# Patient Record
Sex: FEMALE | Race: WHITE | NOT HISPANIC OR LATINO | Employment: OTHER | URBAN - METROPOLITAN AREA
[De-identification: names, ages, dates, MRNs, and addresses within clinical notes are randomized per-mention and may not be internally consistent; named-entity substitution may affect disease eponyms.]

---

## 2021-12-16 ENCOUNTER — HOSPITAL ENCOUNTER (OUTPATIENT)
Facility: HOSPITAL | Age: 81
LOS: 1 days | Discharge: HOME | End: 2021-12-19
Attending: EMERGENCY MEDICINE | Admitting: EMERGENCY MEDICINE

## 2021-12-16 ENCOUNTER — APPOINTMENT (OUTPATIENT)
Dept: GENERAL RADIOLOGY | Facility: HOSPITAL | Age: 81
End: 2021-12-16

## 2021-12-16 ENCOUNTER — APPOINTMENT (OUTPATIENT)
Dept: CT IMAGING | Facility: HOSPITAL | Age: 81
End: 2021-12-16

## 2021-12-16 DIAGNOSIS — D64.9 ANEMIA, UNSPECIFIED TYPE: ICD-10-CM

## 2021-12-16 DIAGNOSIS — Z79.01 CHRONIC ANTICOAGULATION: ICD-10-CM

## 2021-12-16 DIAGNOSIS — I10 HYPERTENSION, UNSPECIFIED TYPE: ICD-10-CM

## 2021-12-16 DIAGNOSIS — I48.91 ATRIAL FIBRILLATION, UNSPECIFIED TYPE (HCC): ICD-10-CM

## 2021-12-16 DIAGNOSIS — R42 DIZZINESS: ICD-10-CM

## 2021-12-16 DIAGNOSIS — K92.2 GASTROINTESTINAL HEMORRHAGE, UNSPECIFIED GASTROINTESTINAL HEMORRHAGE TYPE: Primary | ICD-10-CM

## 2021-12-16 PROBLEM — E78.5 HLD (HYPERLIPIDEMIA): Status: ACTIVE | Noted: 2021-12-16

## 2021-12-16 LAB
ABO GROUP BLD: NORMAL
ABO GROUP BLD: NORMAL
ALBUMIN SERPL-MCNC: 3.9 G/DL (ref 3.5–5.2)
ALBUMIN/GLOB SERPL: 1.7 G/DL
ALP SERPL-CCNC: 64 U/L (ref 39–117)
ALT SERPL W P-5'-P-CCNC: 15 U/L (ref 1–33)
ANION GAP SERPL CALCULATED.3IONS-SCNC: 10 MMOL/L (ref 5–15)
AST SERPL-CCNC: 18 U/L (ref 1–32)
B PARAPERT DNA SPEC QL NAA+PROBE: NOT DETECTED
B PERT DNA SPEC QL NAA+PROBE: NOT DETECTED
BASOPHILS # BLD AUTO: 0.03 10*3/MM3 (ref 0–0.2)
BASOPHILS NFR BLD AUTO: 0.3 % (ref 0–1.5)
BILIRUB SERPL-MCNC: 0.3 MG/DL (ref 0–1.2)
BILIRUB UR QL STRIP: NEGATIVE
BLD GP AB SCN SERPL QL: NEGATIVE
BUN SERPL-MCNC: 39 MG/DL (ref 8–23)
BUN/CREAT SERPL: 32 (ref 7–25)
C PNEUM DNA NPH QL NAA+NON-PROBE: NOT DETECTED
CALCIUM SPEC-SCNC: 8.8 MG/DL (ref 8.6–10.5)
CHLORIDE SERPL-SCNC: 97 MMOL/L (ref 98–107)
CLARITY UR: CLEAR
CO2 SERPL-SCNC: 24 MMOL/L (ref 22–29)
COLOR UR: YELLOW
CREAT SERPL-MCNC: 1.22 MG/DL (ref 0.57–1)
DEPRECATED RDW RBC AUTO: 44.7 FL (ref 37–54)
DEVELOPER EXPIRATION DATE: ABNORMAL
DEVELOPER LOT NUMBER: ABNORMAL
EOSINOPHIL # BLD AUTO: 0 10*3/MM3 (ref 0–0.4)
EOSINOPHIL NFR BLD AUTO: 0 % (ref 0.3–6.2)
ERYTHROCYTE [DISTWIDTH] IN BLOOD BY AUTOMATED COUNT: 12.9 % (ref 12.3–15.4)
EXPIRATION DATE: ABNORMAL
FECAL OCCULT BLOOD SCREEN, POC: POSITIVE
FLUAV SUBTYP SPEC NAA+PROBE: NOT DETECTED
FLUBV RNA ISLT QL NAA+PROBE: NOT DETECTED
GFR SERPL CREATININE-BSD FRML MDRD: 42 ML/MIN/1.73
GLOBULIN UR ELPH-MCNC: 2.3 GM/DL
GLUCOSE SERPL-MCNC: 154 MG/DL (ref 65–99)
GLUCOSE UR STRIP-MCNC: NEGATIVE MG/DL
HADV DNA SPEC NAA+PROBE: NOT DETECTED
HCOV 229E RNA SPEC QL NAA+PROBE: NOT DETECTED
HCOV HKU1 RNA SPEC QL NAA+PROBE: NOT DETECTED
HCOV NL63 RNA SPEC QL NAA+PROBE: NOT DETECTED
HCOV OC43 RNA SPEC QL NAA+PROBE: NOT DETECTED
HCT VFR BLD AUTO: 22.9 % (ref 34–46.6)
HGB BLD-MCNC: 6.7 G/DL (ref 12–15.9)
HGB BLD-MCNC: 7.8 G/DL (ref 12–15.9)
HGB UR QL STRIP.AUTO: NEGATIVE
HMPV RNA NPH QL NAA+NON-PROBE: NOT DETECTED
HOLD SPECIMEN: NORMAL
HPIV1 RNA ISLT QL NAA+PROBE: NOT DETECTED
HPIV2 RNA SPEC QL NAA+PROBE: NOT DETECTED
HPIV3 RNA NPH QL NAA+PROBE: NOT DETECTED
HPIV4 P GENE NPH QL NAA+PROBE: NOT DETECTED
IMM GRANULOCYTES # BLD AUTO: 0.06 10*3/MM3 (ref 0–0.05)
IMM GRANULOCYTES NFR BLD AUTO: 0.5 % (ref 0–0.5)
KETONES UR QL STRIP: NEGATIVE
LEUKOCYTE ESTERASE UR QL STRIP.AUTO: NEGATIVE
LYMPHOCYTES # BLD AUTO: 1.11 10*3/MM3 (ref 0.7–3.1)
LYMPHOCYTES NFR BLD AUTO: 9.5 % (ref 19.6–45.3)
Lab: ABNORMAL
M PNEUMO IGG SER IA-ACNC: NOT DETECTED
MCH RBC QN AUTO: 31.8 PG (ref 26.6–33)
MCHC RBC AUTO-ENTMCNC: 34.1 G/DL (ref 31.5–35.7)
MCV RBC AUTO: 93.5 FL (ref 79–97)
MONOCYTES # BLD AUTO: 0.69 10*3/MM3 (ref 0.1–0.9)
MONOCYTES NFR BLD AUTO: 5.9 % (ref 5–12)
NEGATIVE CONTROL: NEGATIVE
NEUTROPHILS NFR BLD AUTO: 83.8 % (ref 42.7–76)
NEUTROPHILS NFR BLD AUTO: 9.83 10*3/MM3 (ref 1.7–7)
NITRITE UR QL STRIP: NEGATIVE
NRBC BLD AUTO-RTO: 0 /100 WBC (ref 0–0.2)
NT-PROBNP SERPL-MCNC: 2576 PG/ML (ref 0–1800)
PH UR STRIP.AUTO: 5.5 [PH] (ref 5–8)
PLATELET # BLD AUTO: 279 10*3/MM3 (ref 140–450)
PMV BLD AUTO: 9.9 FL (ref 6–12)
POSITIVE CONTROL: POSITIVE
POTASSIUM SERPL-SCNC: 4.4 MMOL/L (ref 3.5–5.2)
PROT SERPL-MCNC: 6.2 G/DL (ref 6–8.5)
PROT UR QL STRIP: NEGATIVE
QT INTERVAL: 436 MS
QTC INTERVAL: 480 MS
RBC # BLD AUTO: 2.45 10*6/MM3 (ref 3.77–5.28)
RH BLD: POSITIVE
RH BLD: POSITIVE
RHINOVIRUS RNA SPEC NAA+PROBE: NOT DETECTED
RSV RNA NPH QL NAA+NON-PROBE: NOT DETECTED
SARS-COV-2 RNA NPH QL NAA+NON-PROBE: NOT DETECTED
SODIUM SERPL-SCNC: 131 MMOL/L (ref 136–145)
SP GR UR STRIP: 1.02 (ref 1–1.03)
T&S EXPIRATION DATE: NORMAL
TROPONIN T SERPL-MCNC: <0.01 NG/ML (ref 0–0.03)
UROBILINOGEN UR QL STRIP: NORMAL
WBC NRBC COR # BLD: 11.72 10*3/MM3 (ref 3.4–10.8)
WHOLE BLOOD HOLD SPECIMEN: NORMAL
WHOLE BLOOD HOLD SPECIMEN: NORMAL

## 2021-12-16 PROCEDURE — A9270 NON-COVERED ITEM OR SERVICE: HCPCS | Performed by: NURSE PRACTITIONER

## 2021-12-16 PROCEDURE — 0202U NFCT DS 22 TRGT SARS-COV-2: CPT | Performed by: EMERGENCY MEDICINE

## 2021-12-16 PROCEDURE — 36430 TRANSFUSION BLD/BLD COMPNT: CPT

## 2021-12-16 PROCEDURE — 99284 EMERGENCY DEPT VISIT MOD MDM: CPT

## 2021-12-16 PROCEDURE — G0378 HOSPITAL OBSERVATION PER HR: HCPCS

## 2021-12-16 PROCEDURE — 99220 PR INITIAL OBSERVATION CARE/DAY 70 MINUTES: CPT | Performed by: INTERNAL MEDICINE

## 2021-12-16 PROCEDURE — 81003 URINALYSIS AUTO W/O SCOPE: CPT | Performed by: PHYSICIAN ASSISTANT

## 2021-12-16 PROCEDURE — 93005 ELECTROCARDIOGRAM TRACING: CPT | Performed by: PHYSICIAN ASSISTANT

## 2021-12-16 PROCEDURE — 86901 BLOOD TYPING SEROLOGIC RH(D): CPT | Performed by: EMERGENCY MEDICINE

## 2021-12-16 PROCEDURE — 85018 HEMOGLOBIN: CPT | Performed by: NURSE PRACTITIONER

## 2021-12-16 PROCEDURE — 25010000002 IOPAMIDOL 61 % SOLUTION: Performed by: EMERGENCY MEDICINE

## 2021-12-16 PROCEDURE — 86900 BLOOD TYPING SEROLOGIC ABO: CPT

## 2021-12-16 PROCEDURE — 80053 COMPREHEN METABOLIC PANEL: CPT | Performed by: EMERGENCY MEDICINE

## 2021-12-16 PROCEDURE — A9270 NON-COVERED ITEM OR SERVICE: HCPCS | Performed by: INTERNAL MEDICINE

## 2021-12-16 PROCEDURE — 74177 CT ABD & PELVIS W/CONTRAST: CPT

## 2021-12-16 PROCEDURE — 85025 COMPLETE CBC W/AUTO DIFF WBC: CPT

## 2021-12-16 PROCEDURE — 96374 THER/PROPH/DIAG INJ IV PUSH: CPT

## 2021-12-16 PROCEDURE — P9016 RBC LEUKOCYTES REDUCED: HCPCS

## 2021-12-16 PROCEDURE — 71045 X-RAY EXAM CHEST 1 VIEW: CPT

## 2021-12-16 PROCEDURE — 86900 BLOOD TYPING SEROLOGIC ABO: CPT | Performed by: EMERGENCY MEDICINE

## 2021-12-16 PROCEDURE — 86923 COMPATIBILITY TEST ELECTRIC: CPT

## 2021-12-16 PROCEDURE — 82270 OCCULT BLOOD FECES: CPT | Performed by: PHYSICIAN ASSISTANT

## 2021-12-16 PROCEDURE — 63710000001 FLECAINIDE 50 MG TABLET: Performed by: NURSE PRACTITIONER

## 2021-12-16 PROCEDURE — 84484 ASSAY OF TROPONIN QUANT: CPT | Performed by: PHYSICIAN ASSISTANT

## 2021-12-16 PROCEDURE — 86850 RBC ANTIBODY SCREEN: CPT | Performed by: EMERGENCY MEDICINE

## 2021-12-16 PROCEDURE — 83880 ASSAY OF NATRIURETIC PEPTIDE: CPT | Performed by: PHYSICIAN ASSISTANT

## 2021-12-16 PROCEDURE — 86901 BLOOD TYPING SEROLOGIC RH(D): CPT

## 2021-12-16 PROCEDURE — 70450 CT HEAD/BRAIN W/O DYE: CPT

## 2021-12-16 PROCEDURE — 96376 TX/PRO/DX INJ SAME DRUG ADON: CPT

## 2021-12-16 PROCEDURE — 63710000001 FLECAINIDE 50 MG TABLET: Performed by: INTERNAL MEDICINE

## 2021-12-16 RX ORDER — HYDROCHLOROTHIAZIDE 25 MG/1
25 TABLET ORAL DAILY
COMMUNITY
End: 2021-12-19 | Stop reason: HOSPADM

## 2021-12-16 RX ORDER — SODIUM CHLORIDE 0.9 % (FLUSH) 0.9 %
10 SYRINGE (ML) INJECTION AS NEEDED
Status: DISCONTINUED | OUTPATIENT
Start: 2021-12-16 | End: 2021-12-19 | Stop reason: HOSPADM

## 2021-12-16 RX ORDER — ACETAMINOPHEN 325 MG/1
650 TABLET ORAL EVERY 4 HOURS PRN
Status: DISCONTINUED | OUTPATIENT
Start: 2021-12-16 | End: 2021-12-19 | Stop reason: HOSPADM

## 2021-12-16 RX ORDER — PANTOPRAZOLE SODIUM 40 MG/10ML
40 INJECTION, POWDER, LYOPHILIZED, FOR SOLUTION INTRAVENOUS EVERY 12 HOURS SCHEDULED
Status: DISCONTINUED | OUTPATIENT
Start: 2021-12-16 | End: 2021-12-19 | Stop reason: HOSPADM

## 2021-12-16 RX ORDER — PANTOPRAZOLE SODIUM 40 MG/10ML
40 INJECTION, POWDER, LYOPHILIZED, FOR SOLUTION INTRAVENOUS ONCE
Status: COMPLETED | OUTPATIENT
Start: 2021-12-16 | End: 2021-12-16

## 2021-12-16 RX ORDER — PRAZOSIN HYDROCHLORIDE 1 MG/1
1 CAPSULE ORAL NIGHTLY
COMMUNITY

## 2021-12-16 RX ORDER — ASCORBIC ACID 100 MG
TABLET,CHEWABLE ORAL
COMMUNITY

## 2021-12-16 RX ORDER — FLECAINIDE ACETATE 100 MG/1
100 TABLET ORAL 2 TIMES DAILY
COMMUNITY

## 2021-12-16 RX ORDER — MULTIPLE VITAMINS W/ MINERALS TAB 9MG-400MCG
1 TAB ORAL DAILY
COMMUNITY

## 2021-12-16 RX ORDER — MULTIPLE VITAMINS W/ MINERALS TAB 9MG-400MCG
1 TAB ORAL DAILY
Status: DISCONTINUED | OUTPATIENT
Start: 2021-12-17 | End: 2021-12-19 | Stop reason: HOSPADM

## 2021-12-16 RX ORDER — FLECAINIDE ACETATE 50 MG/1
100 TABLET ORAL 2 TIMES DAILY
Status: DISCONTINUED | OUTPATIENT
Start: 2021-12-16 | End: 2021-12-19 | Stop reason: HOSPADM

## 2021-12-16 RX ORDER — ATENOLOL 50 MG/1
50 TABLET ORAL DAILY
COMMUNITY
End: 2021-12-19 | Stop reason: HOSPADM

## 2021-12-16 RX ORDER — ACETAMINOPHEN 160 MG/5ML
650 SOLUTION ORAL EVERY 4 HOURS PRN
Status: DISCONTINUED | OUTPATIENT
Start: 2021-12-16 | End: 2021-12-19 | Stop reason: HOSPADM

## 2021-12-16 RX ORDER — DILTIAZEM HYDROCHLORIDE EXTENDED-RELEASE TABLETS 240 MG/1
240 TABLET, EXTENDED RELEASE ORAL DAILY
COMMUNITY
End: 2021-12-19 | Stop reason: HOSPADM

## 2021-12-16 RX ORDER — ONDANSETRON 4 MG/1
4 TABLET, FILM COATED ORAL EVERY 6 HOURS PRN
Status: DISCONTINUED | OUTPATIENT
Start: 2021-12-16 | End: 2021-12-19 | Stop reason: HOSPADM

## 2021-12-16 RX ORDER — ONDANSETRON 2 MG/ML
4 INJECTION INTRAMUSCULAR; INTRAVENOUS EVERY 6 HOURS PRN
Status: DISCONTINUED | OUTPATIENT
Start: 2021-12-16 | End: 2021-12-19 | Stop reason: HOSPADM

## 2021-12-16 RX ORDER — SODIUM CHLORIDE 0.9 % (FLUSH) 0.9 %
10 SYRINGE (ML) INJECTION EVERY 12 HOURS SCHEDULED
Status: DISCONTINUED | OUTPATIENT
Start: 2021-12-16 | End: 2021-12-19 | Stop reason: HOSPADM

## 2021-12-16 RX ORDER — ACETAMINOPHEN 650 MG/1
650 SUPPOSITORY RECTAL EVERY 4 HOURS PRN
Status: DISCONTINUED | OUTPATIENT
Start: 2021-12-16 | End: 2021-12-19 | Stop reason: HOSPADM

## 2021-12-16 RX ADMIN — FLECAINIDE ACETATE 100 MG: 50 TABLET ORAL at 22:24

## 2021-12-16 RX ADMIN — PANTOPRAZOLE SODIUM 40 MG: 40 INJECTION, POWDER, FOR SOLUTION INTRAVENOUS at 15:40

## 2021-12-16 RX ADMIN — IOPAMIDOL 95 ML: 612 INJECTION, SOLUTION INTRAVENOUS at 14:22

## 2021-12-16 RX ADMIN — PANTOPRAZOLE SODIUM 40 MG: 40 INJECTION, POWDER, FOR SOLUTION INTRAVENOUS at 22:24

## 2021-12-16 RX ADMIN — SODIUM CHLORIDE, PRESERVATIVE FREE 10 ML: 5 INJECTION INTRAVENOUS at 22:24

## 2021-12-16 NOTE — H&P
[facility] Medicine Services  HISTORY AND PHYSICAL    Patient Name: Vy Dao  : 1940  MRN: [id number]  Primary Care Physician: Shima Jennings MD  Date of admission: 2021    Subjective   Subjective     Chief Complaint:  Bloody stool    HPI:  Vy Dao is a 81 y.o. female with PMH  Of Afib with anticoagulation, HTN, HLD. Patient was diagnosed with Afib 3 weeks ago and was started on xarelto. She started noticing some bright red blood in her stool about 2 weeks ago. She has a hx of hemorrhoids and took a supp which helped but after a few days the blood returned. Over the last two days she has been getting weak, dizzy and feeling like she was going to pass out every time she stood up. She did have some chest pressure at times. She also noticed her blood pressure was lower than normal. She has not taken her xarelto today. She denies any soa, fever, chills, or N/v/D.     In ED: bnp 2576, WBc 11.72, HGb 7.8, creat 1.22, , trop <0.010, + occult stool      COVID Details:        Symptoms: [x] NONE [] Fever []  Cough [] Shortness of breath [] Change in taste or smell  The patient qualifies to receive the vaccine, but they have not yet received it.    Review of Systems   Constitutional: Positive for appetite change and fatigue. Negative for chills and fever.   Respiratory: Positive for shortness of breath.    Cardiovascular: Negative for chest pain.   Gastrointestinal: Positive for blood in stool. Negative for abdominal distention, abdominal pain, diarrhea, nausea and vomiting.   Genitourinary: Negative for dysuria.   Neurological: Positive for dizziness, weakness and light-headedness.        All other systems reviewed and are negative.     Personal History     Past Medical History:   Diagnosis Date   • Atrial fibrillation (HCC)    • Hypertension    • Urinary tract infection        History reviewed. No pertinent surgical history.    Family History:  family history  includes Atrial fibrillation in her sister; Cancer in her sister; Heart disease in her father. Otherwise pertinent FHx was reviewed and unremarkable.     Social History:  reports that she quit smoking about 30 years ago. She has never used smokeless tobacco. She reports that she does not drink alcohol and does not use drugs.  Social History     Social History Narrative   • Not on file       Medications:  Cholecalciferol, Coenzyme Q10, Glucosamine Complex, Olmesartan Medoxomil, Omega-3 Fatty Acids, Vitamin C, atenolol, dilTIAZem LA, flecainide, hydroCHLOROthiazide, multivitamin with minerals, prazosin, and rivaroxaban    Allergies   Allergen Reactions   • Amoxicillin Rash       Objective   Objective     Vital Signs:   Temp:  [99.8 °F (37.7 °C)] 99.8 °F (37.7 °C)  Heart Rate:  [67-75] 72  Resp:  [20] 20  BP: (116-140)/(62-75) 136/73    Physical Exam   Constitutional: Awake, alert  Eyes: PERRLA, sclerae anicteric, no conjunctival injection  HENT: NCAT, mucous membranes moist  Neck: Supple, no thyromegaly, no lymphadenopathy, trachea midline  Respiratory: Clear to auscultation bilaterally, nonlabored respirations room air 97%  Cardiovascular: irregular, no murmurs, rubs, or gallops, palpable pedal pulses bilaterally  Gastrointestinal: Positive bowel sounds, soft, nontender, nondistended  Musculoskeletal: No bilateral ankle edema, no clubbing or cyanosis to extremities  Psychiatric: Appropriate affect, cooperative  Neurologic: Oriented x 3, strength symmetric in all extremities, Cranial Nerves grossly intact to confrontation, speech clear  Skin: No rashes, pale       Result Review:  I have personally reviewed the results from the time of this admission to 12/16/21 3:59 PM EST and agree with these findings:  [x]  Laboratory  []  Microbiology  [x]  Radiology  []  EKG/Telemetry   []  Cardiology/Vascular   []  Pathology  []  Old records  []  Other:  Most notable findings include:       LAB RESULTS:      Lab 12/16/21  5530    WBC 11.72*   HEMOGLOBIN 7.8*   HEMATOCRIT 22.9*   PLATELETS 279   NEUTROS ABS 9.83*   IMMATURE GRANS (ABS) 0.06*   LYMPHS ABS 1.11   MONOS ABS 0.69   EOS ABS 0.00   MCV 93.5         Lab 12/16/21  1243   SODIUM 131*   POTASSIUM 4.4   CHLORIDE 97*   CO2 24.0   ANION GAP 10.0   BUN 39*   CREATININE 1.22*   GLUCOSE 154*   CALCIUM 8.8         Lab 12/16/21  1243   TOTAL PROTEIN 6.2   ALBUMIN 3.90   GLOBULIN 2.3   ALT (SGPT) 15   AST (SGOT) 18   BILIRUBIN 0.3   ALK PHOS 64         Lab 12/16/21  1243   PROBNP 2,576.0*   TROPONIN T <0.010             Lab 12/16/21  1245   ABO TYPING O   RH TYPING Positive   ANTIBODY SCREEN Negative         UA    Urinalysis 12/16/21   Specific Gustine, UA 1.019   Ketones, UA Negative   Blood, UA Negative   Leukocytes, UA Negative   Nitrite, UA Negative           Microbiology Results (last 10 days)     Procedure Component Value - Date/Time    Respiratory Panel PCR w/COVID-19(SARS-CoV-2) MELISSA/EDVIN/TIANNA/PAD/COR/MAD/DOMINIQUE In-House, NP Swab in UTM/VTM, 3-4 HR TAT - Swab, Nasopharynx [[id number]]  (Normal) Collected: 12/16/21 1555    Lab Status: Final result Specimen: Swab from Nasopharynx Updated: 12/16/21 1728     ADENOVIRUS, PCR Not Detected     Coronavirus 229E Not Detected     Coronavirus HKU1 Not Detected     Coronavirus NL63 Not Detected     Coronavirus OC43 Not Detected     COVID19 Not Detected     Human Metapneumovirus Not Detected     Human Rhinovirus/Enterovirus Not Detected     Influenza A PCR Not Detected     Influenza B PCR Not Detected     Parainfluenza Virus 1 Not Detected     Parainfluenza Virus 2 Not Detected     Parainfluenza Virus 3 Not Detected     Parainfluenza Virus 4 Not Detected     RSV, PCR Not Detected     Bordetella pertussis pcr Not Detected     Bordetella parapertussis PCR Not Detected     Chlamydophila pneumoniae PCR Not Detected     Mycoplasma pneumo by PCR Not Detected    Narrative:      In the setting of a positive respiratory panel with a viral infection PLUS a  negative procalcitonin without other underlying concern for bacterial infection, consider observing off antibiotics or discontinuation of antibiotics and continue supportive care. If the respiratory panel is positive for atypical bacterial infection (Bordetella pertussis, Chlamydophila pneumoniae, or Mycoplasma pneumoniae), consider antibiotic de-escalation to target atypical bacterial infection.          CT Head Without Contrast    Result Date: 12/16/2021  EXAMINATION: CT HEAD WO CONTRAST-12/16/2021:  INDICATION: Dizzy, GI bleed.  TECHNIQUE: CT head without intravenous contrast administration.  The radiation dose reduction device was turned on for each scan per the ALARA (As Low as Reasonably Achievable) protocol.  COMPARISON: NONE.  FINDINGS: The midline structures are symmetric without evidence of mass, mass effect or midline shift. Ventricles in normal limits, however, sulci moderately prominent towards the vertex of at least mild-to-moderate generalized atrophy and/or age-related volume loss. No intra-axial hemorrhage or extra-axial fluid collection. Globes and orbits are unremarkable. The paranasal sinuses and mastoid air cells are grossly clear and well pneumatized. Calvarium intact.      Impression: No acute intracranial findings with mild-to-moderate senescent changes.  D:  12/16/2021 E:  12/16/2021    This report was finalized on 12/16/2021 4:40 PM by Dr. Akash Gramajo.      CT Abdomen Pelvis With Contrast    Result Date: 12/16/2021  EXAMINATION: CT ABDOMEN AND PELVIS W CONTRAST-12/16/2021:  INDICATION: Bloody stool.  TECHNIQUE: CT abdomen and pelvis with intravenous contrast.  The radiation dose reduction device was turned on for each scan per the ALARA (As Low as Reasonably Achievable) protocol.  COMPARISON: NONE.  FINDINGS: The lung bases demonstrate minimal subsegmental atelectasis without consolidation or effusion. The liver demonstrates diffuse low-attenuation of hepatic steatosis with several small  subcentimeter areas of low attenuation consistent of hepatic cysts although too small to characterize without dominant focus separate. The gallbladder contains numerous stones in the dependent portion of cholelithiasis without wall thickening. No biliary dilatation. Pancreas and spleen unremarkable. Adrenals without distinct nodule. Kidneys without hydronephrosis or hydroureter. No bulky retroperitoneal adenopathy. Atherosclerotic, nonaneurysmal, patent abdominal aorta with patent celiac and SMA origins. Portal veins and IVC patent. GI tract evaluation reveals small hiatal hernia. No disproportionate dilatation of bowel to suggest mechanical obstructive process. Pancolonic diverticulosis without evidence for acute diverticulitis. No free fluid or intra-abdominal free air. Pelvic viscera unremarkable without bulky pelvic adenopathy or free fluid. Degenerative changes of the spine and pelvis without aggressive osseous lesion. No soft tissue body wall findings of acuity.      Impression: No focal inflammatory findings of bowel or hyperdense signal within the bowel to suggest blood products. No perforation or abscess.  D:  12/16/2021 E:  12/16/2021  This report was finalized on 12/16/2021 4:40 PM by Dr. Akash Gramajo.      XR Chest 1 View    Result Date: 12/16/2021  EXAMINATION: XR CHEST 1 VW-12/16/2021:  INDICATION: GI bleed.  COMPARISON: NONE.  FINDINGS: Single view of the chest reveals cardiac size borderline enlarged without overt edema. No pneumothorax or pleural effusion. Chronic changes. No consolidation.      Impression: No acute cardiopulmonary process.  D:  12/16/2021 E:  12/16/2021  This report was finalized on 12/16/2021 4:39 PM by Dr. Akash Gramajo.            Assessment/Plan   Assessment & Plan       Lower GI bleed    A-fib (HCC)    HTN (hypertension)    HLD (hyperlipidemia)    Lower GI bleed   Symptomatic anemia   -- consult GI  -- transfuse 2 units PRBC  -- check H/H q 6  -- IV protonix q 12  -- clear  liquids   -- + occult stool     AFIB  -- hold xarelto  -- cont flecainide   -- follows with Cardiologist in [place] but may want to switch over care here  -- defer to am provider on consulting cardiology if needed    HTN  HLD  -- hold home antihypertensive meds     Renal insufficiency  - unknown baseline  -- likely related to volume depletion   -- labs in am   -- give blood     Chest pressure  -- trop neg but will trend  -- likely due to mismatch of supply and demand     DVT prophylaxis:  [facility]     CODE STATUS:  Full code   Level Of Support Discussed With: Patient  Code Status (Patient has no pulse and is not breathing): CPR (Attempt to Resuscitate)  Medical Interventions (Patient has pulse or is breathing): Full Support      This note has been completed as part of a split-shared workflow.   Signature:. Electronically signed by ELISE Mock, 12/16/21, 3:59 PM EST.  Patient seen and examined at the bedside.  Patient is a very pleasant 81-year-old  female with past medical history significant for hypertension, hyperlipidemia, atrial fibrillation and patient was started on Xarelto recently.  Patient also has history of hemorrhoids.  Tells me that she had colonoscopy done about 2 years ago and no polyps were found.  Patient tells me that started seeing some blood about 2 weeks ago.  Used some suppositories and it subsided for 3 to 4 days but then restarted again.  Patient sees fresh red blood in the toilet.  Patient also complains of dizziness particularly when she goes from sitting to standing positions.  Denies any fever or chills or cough or coryza for past few days.  Physical exam:    Constitutional: No acute distress, looks comfortable  HENT: NCAT, mucous membranes moist  Respiratory: Clear to auscultation bilaterally, respiratory effort normal   Cardiovascular: Irregularly irregular, no murmurs, rubs, or gallops  Gastrointestinal: Positive bowel sounds, soft, nontender,  nondistended  Musculoskeletal:  bilateral ankle edema  Psychiatric: Appropriate affect, cooperative  Neurologic: Awake, alert, oriented x3, no focality appreciated, speech clear  Skin: No rashes    Assessment and plan:  Patient has anemia secondary to GI blood loss.  She is symptomatic with orthostasis and dizziness.  We will start transfusion.  We will also ask GI to see the patient tomorrow.  Will monitor hemoglobin and hematocrit also.  Please see the above for more details.  Patient will be admitted for inpatient.

## 2021-12-16 NOTE — ED PROVIDER NOTES
Subjective   Pt is a 80 yo female presenting to ED with complaints of bloody stool. PMHx significant for HTN, HLD and Afib. Pt and sister providing hx. They explain patient diagnosed with new onset Afib about 3 weeks ago by Cards / PCP in [place]. She has been on Xarelto and Flecainide. She reports bright red blood mixed with stool since starting the Xarelto. She does have hx of hemorrhoids and has been having painful bowel movements. Over the last few days she has started to feel dizzy and weak especially with standing up. She noticed her systolic blood pressure has been low with readings 85-90. She denies headache, syncope or confusion. She denies chest pain, SOB, cough, N/V/D, abdominal pain, urinary sx or swelling to LE. She reports last colonoscopy with Dr. Schulte several years ago. She denies hx of GI bleed. Pt denies recent admission or antibiotics. She has had her Covid vaccine (x3). She reports her Afib started shortly after receiving her booster vaccine. She denies tobacco, drug or ETOH use.       History provided by:  Patient and relative      Review of Systems   Constitutional: Positive for fatigue. Negative for chills and fever.   HENT: Negative for congestion and trouble swallowing.    Eyes: Negative for visual disturbance.   Respiratory: Negative for cough and shortness of breath.    Cardiovascular: Negative for chest pain and leg swelling.   Gastrointestinal: Positive for blood in stool and rectal pain. Negative for abdominal pain, constipation, diarrhea, nausea and vomiting.   Genitourinary: Negative for difficulty urinating, dysuria, flank pain, hematuria and vaginal bleeding.   Musculoskeletal: Negative for arthralgias and back pain.   Skin: Negative for rash and wound.   Neurological: Positive for dizziness and weakness. Negative for syncope, speech difficulty, numbness and headaches.   Psychiatric/Behavioral: Negative for confusion.   All other systems reviewed and are negative.      Past  Medical History:   Diagnosis Date   • Atrial fibrillation (HCC)    • Hypertension        Allergies   Allergen Reactions   • Amoxicillin Rash       History reviewed. No pertinent surgical history.    History reviewed. No pertinent family history.    Social History     Socioeconomic History   • Marital status:    Tobacco Use   • Smoking status: Former Smoker     Quit date: 1991     Years since quittin.0   • Smokeless tobacco: Never Used           Objective   Physical Exam  Vitals and nursing note reviewed.   Constitutional:       Appearance: She is well-developed.   HENT:      Head: Atraumatic.      Nose: Nose normal.   Eyes:      General: Lids are normal.      Conjunctiva/sclera: Conjunctivae normal.      Pupils: Pupils are equal, round, and reactive to light.   Cardiovascular:      Rate and Rhythm: Normal rate and regular rhythm.      Heart sounds: Normal heart sounds.   Pulmonary:      Effort: Pulmonary effort is normal.      Breath sounds: Normal breath sounds. No wheezing.   Abdominal:      General: There is no distension.      Palpations: Abdomen is soft.      Tenderness: There is no abdominal tenderness. There is no guarding or rebound.   Musculoskeletal:         General: No tenderness. Normal range of motion.      Cervical back: Normal range of motion and neck supple.   Skin:     General: Skin is warm and dry.      Findings: No erythema or rash.   Neurological:      Mental Status: She is alert and oriented to person, place, and time.      Sensory: No sensory deficit.   Psychiatric:         Speech: Speech normal.         Behavior: Behavior normal.         Procedures           ED Course  ED Course as of 21 1600   Thu Dec 16, 2021   1311 Hemoglobin(!): 7.8 [RT]   1311 Hematocrit(!): 22.9 [RT]   1535 Fecal Occult Blood(!): Positive [RT]      ED Course User Index  [RT] Claudia Ojeda PA      Re-examined patient several times in ED. Pt resting and vitals stable. Discussed results and tx  plan for admission.     Discussed patient with Dr. Asher who is agreeable with admission and initiating blood transfusion in ED for symptomatic anemia in the setting of anticoagulation with Xarelto for newly diagnosed Afib.     Discussed admission with hospitalist Dr. Prado.     Recent Results (from the past 24 hour(s))   Comprehensive Metabolic Panel    Collection Time: 12/16/21 12:43 PM    Specimen: Blood   Result Value Ref Range    Glucose 154 (H) 65 - 99 mg/dL    BUN 39 (H) 8 - 23 mg/dL    Creatinine 1.22 (H) 0.57 - 1.00 mg/dL    Sodium 131 (L) 136 - 145 mmol/L    Potassium 4.4 3.5 - 5.2 mmol/L    Chloride 97 (L) 98 - 107 mmol/L    CO2 24.0 22.0 - 29.0 mmol/L    Calcium 8.8 8.6 - 10.5 mg/dL    Total Protein 6.2 6.0 - 8.5 g/dL    Albumin 3.90 3.50 - 5.20 g/dL    ALT (SGPT) 15 1 - 33 U/L    AST (SGOT) 18 1 - 32 U/L    Alkaline Phosphatase 64 39 - 117 U/L    Total Bilirubin 0.3 0.0 - 1.2 mg/dL    eGFR Non African Amer 42 (L) >60 mL/min/1.73    Globulin 2.3 gm/dL    A/G Ratio 1.7 g/dL    BUN/Creatinine Ratio 32.0 (H) 7.0 - 25.0    Anion Gap 10.0 5.0 - 15.0 mmol/L   Green Top (Gel)    Collection Time: 12/16/21 12:43 PM   Result Value Ref Range    Extra Tube Hold for add-ons.    Lavender Top    Collection Time: 12/16/21 12:43 PM   Result Value Ref Range    Extra Tube hold for add-on    Gold Top - SST    Collection Time: 12/16/21 12:43 PM   Result Value Ref Range    Extra Tube Hold for add-ons.    Light Blue Top    Collection Time: 12/16/21 12:43 PM   Result Value Ref Range    Extra Tube hold for add-on    CBC Auto Differential    Collection Time: 12/16/21 12:43 PM    Specimen: Blood   Result Value Ref Range    WBC 11.72 (H) 3.40 - 10.80 10*3/mm3    RBC 2.45 (L) 3.77 - 5.28 10*6/mm3    Hemoglobin 7.8 (L) 12.0 - 15.9 g/dL    Hematocrit 22.9 (L) 34.0 - 46.6 %    MCV 93.5 79.0 - 97.0 fL    MCH 31.8 26.6 - 33.0 pg    MCHC 34.1 31.5 - 35.7 g/dL    RDW 12.9 12.3 - 15.4 %    RDW-SD 44.7 37.0 - 54.0 fl    MPV 9.9 6.0 -  12.0 fL    Platelets 279 140 - 450 10*3/mm3    Neutrophil % 83.8 (H) 42.7 - 76.0 %    Lymphocyte % 9.5 (L) 19.6 - 45.3 %    Monocyte % 5.9 5.0 - 12.0 %    Eosinophil % 0.0 (L) 0.3 - 6.2 %    Basophil % 0.3 0.0 - 1.5 %    Immature Grans % 0.5 0.0 - 0.5 %    Neutrophils, Absolute 9.83 (H) 1.70 - 7.00 10*3/mm3    Lymphocytes, Absolute 1.11 0.70 - 3.10 10*3/mm3    Monocytes, Absolute 0.69 0.10 - 0.90 10*3/mm3    Eosinophils, Absolute 0.00 0.00 - 0.40 10*3/mm3    Basophils, Absolute 0.03 0.00 - 0.20 10*3/mm3    Immature Grans, Absolute 0.06 (H) 0.00 - 0.05 10*3/mm3    nRBC 0.0 0.0 - 0.2 /100 WBC   BNP    Collection Time: 12/16/21 12:43 PM    Specimen: Blood   Result Value Ref Range    proBNP 2,576.0 (H) 0.0-1,800.0 pg/mL   Troponin    Collection Time: 12/16/21 12:43 PM    Specimen: Blood   Result Value Ref Range    Troponin T <0.010 0.000 - 0.030 ng/mL   Type & Screen    Collection Time: 12/16/21 12:45 PM    Specimen: Blood   Result Value Ref Range    ABO Type O     RH type Positive     Antibody Screen Negative     T&S Expiration Date 12/19/2021 11:59:59 PM    ECG 12 Lead    Collection Time: 12/16/21  1:35 PM   Result Value Ref Range    QT Interval 436 ms    QTC Interval 480 ms   POCT Occult Blood, stool    Collection Time: 12/16/21  3:24 PM    Specimen: Per Rectum; Stool   Result Value Ref Range    Fecal Occult Blood Positive (A) Negative    Lot Number [id number]     Expiration Date 02/24/2021     DEVELOPER LOT NUMBER [id number]     DEVELOPER EXPIRATION DATE 6/2,024     Positive Control Positive Positive    Negative Control Negative Negative     Note: In addition to lab results from this visit, the labs listed above may include labs taken at another facility or during a different encounter within the last 24 hours. Please correlate lab times with ED admission and discharge times for further clarification of the services performed during this visit.    CT Abdomen Pelvis With Contrast   Preliminary Result   No focal inflammatory  findings of bowel or hyperdense signal   within the bowel to suggest blood products. No perforation or abscess.       D:  12/16/2021   E:  12/16/2021              CT Head Without Contrast   Preliminary Result   No acute intracranial findings with mild-to-moderate   senescent changes.       D:  12/16/2021   E:  12/16/2021                  XR Chest 1 View   Preliminary Result   No acute cardiopulmonary process.       D:  12/16/2021   E:  12/16/2021                    Vitals:    12/16/21 1340 12/16/21 1400 12/16/21 1500 12/16/21 1530   BP: 140/75 116/70  136/73   BP Location:       Patient Position:       Pulse:   67 72   Resp:       Temp:       TempSrc:       SpO2:   97% 96%   Weight:       Height:         Medications   sodium chloride 0.9 % flush 10 mL (has no administration in time range)   iopamidol (ISOVUE-300) 61 % injection 100 mL (95 mL Intravenous Given 12/16/21 1422)   pantoprazole (PROTONIX) injection 40 mg (40 mg Intravenous Given 12/16/21 1540)     ECG/EMG Results (last 24 hours)     Procedure Component Value Units Date/Time    ECG 12 Lead [[id number]] Collected: 12/16/21 1335     Updated: 12/16/21 1348     QT Interval 436 ms      QTC Interval 480 ms     Narrative:      Test Reason : GI bleed  Blood Pressure :   */*   mmHG  Vent. Rate :  73 BPM     Atrial Rate : 101 BPM     P-R Int :   * ms          QRS Dur : 148 ms      QT Int : 436 ms       P-R-T Axes :   * -48 111 degrees     QTc Int : 480 ms    Atrial fibrillation  Left axis deviation  Left bundle branch block  Abnormal ECG  No previous ECGs available  Confirmed by SHOSHANA NASCIMENTO MD ([id number]) on 12/16/2021 1:48:21 PM    Referred By:            Confirmed By: SHOSHANA NASCIMENTO MD        ECG 12 Lead   Final Result   Test Reason : GI bleed   Blood Pressure :   */*   mmHG   Vent. Rate :  73 BPM     Atrial Rate : 101 BPM      P-R Int :   * ms          QRS Dur : 148 ms       QT Int : 436 ms       P-R-T Axes :   * -48 111 degrees      QTc Int : 480 ms      Atrial  fibrillation   Left axis deviation   Left bundle branch block   Abnormal ECG   No previous ECGs available   Confirmed by SHOSHANA NASCIMENTO MD ([id number]) on 12/16/2021 1:48:21 PM      Referred By:            Confirmed By: SHOSHANA NASCIMENTO MD                                                     [facility]    Final diagnoses:   Gastrointestinal hemorrhage, unspecified gastrointestinal hemorrhage type   Atrial fibrillation, unspecified type (HCC)   Chronic anticoagulation   Anemia, unspecified type   Dizziness       ED Disposition  ED Disposition     ED Disposition Condition Comment    Decision to Admit            No follow-up provider specified.       Medication List      No changes were made to your prescriptions during this visit.          Claudia Ojeda PA  12/16/21 0041

## 2021-12-17 PROBLEM — K92.2 GASTROINTESTINAL HEMORRHAGE: Status: ACTIVE | Noted: 2021-12-17

## 2021-12-17 LAB
ANION GAP SERPL CALCULATED.3IONS-SCNC: 8 MMOL/L (ref 5–15)
BASOPHILS # BLD MANUAL: 0.07 10*3/MM3 (ref 0–0.2)
BASOPHILS NFR BLD MANUAL: 1 % (ref 0–1.5)
BUN SERPL-MCNC: 25 MG/DL (ref 8–23)
BUN/CREAT SERPL: 30.1 (ref 7–25)
CALCIUM SPEC-SCNC: 8.5 MG/DL (ref 8.6–10.5)
CHLORIDE SERPL-SCNC: 108 MMOL/L (ref 98–107)
CO2 SERPL-SCNC: 25 MMOL/L (ref 22–29)
CREAT SERPL-MCNC: 0.83 MG/DL (ref 0.57–1)
DEPRECATED RDW RBC AUTO: 46.7 FL (ref 37–54)
EOSINOPHIL # BLD MANUAL: 0.07 10*3/MM3 (ref 0–0.4)
EOSINOPHIL NFR BLD MANUAL: 1 % (ref 0.3–6.2)
ERYTHROCYTE [DISTWIDTH] IN BLOOD BY AUTOMATED COUNT: 13.7 % (ref 12.3–15.4)
GFR SERPL CREATININE-BSD FRML MDRD: 66 ML/MIN/1.73
GLUCOSE SERPL-MCNC: 95 MG/DL (ref 65–99)
HCT VFR BLD AUTO: 28.8 % (ref 34–46.6)
HGB BLD-MCNC: 9.5 G/DL (ref 12–15.9)
HGB BLD-MCNC: 9.6 G/DL (ref 12–15.9)
LYMPHOCYTES # BLD MANUAL: 1.71 10*3/MM3 (ref 0.7–3.1)
LYMPHOCYTES NFR BLD MANUAL: 8 % (ref 5–12)
MCH RBC QN AUTO: 30.6 PG (ref 26.6–33)
MCHC RBC AUTO-ENTMCNC: 33 G/DL (ref 31.5–35.7)
MCV RBC AUTO: 92.9 FL (ref 79–97)
MONOCYTES # BLD: 0.53 10*3/MM3 (ref 0.1–0.9)
NEUTROPHILS # BLD AUTO: 4.2 10*3/MM3 (ref 1.7–7)
NEUTROPHILS NFR BLD MANUAL: 64 % (ref 42.7–76)
PLAT MORPH BLD: NORMAL
PLATELET # BLD AUTO: 200 10*3/MM3 (ref 140–450)
PMV BLD AUTO: 10.1 FL (ref 6–12)
POTASSIUM SERPL-SCNC: 4.8 MMOL/L (ref 3.5–5.2)
RBC # BLD AUTO: 3.1 10*6/MM3 (ref 3.77–5.28)
RBC MORPH BLD: NORMAL
SODIUM SERPL-SCNC: 141 MMOL/L (ref 136–145)
VARIANT LYMPHS NFR BLD MANUAL: 26 % (ref 19.6–45.3)
WBC MORPH BLD: NORMAL
WBC NRBC COR # BLD: 6.57 10*3/MM3 (ref 3.4–10.8)

## 2021-12-17 PROCEDURE — A9270 NON-COVERED ITEM OR SERVICE: HCPCS | Performed by: INTERNAL MEDICINE

## 2021-12-17 PROCEDURE — 80048 BASIC METABOLIC PNL TOTAL CA: CPT | Performed by: NURSE PRACTITIONER

## 2021-12-17 PROCEDURE — 63710000001 FLECAINIDE 50 MG TABLET: Performed by: INTERNAL MEDICINE

## 2021-12-17 PROCEDURE — A9270 NON-COVERED ITEM OR SERVICE: HCPCS | Performed by: NURSE PRACTITIONER

## 2021-12-17 PROCEDURE — G0378 HOSPITAL OBSERVATION PER HR: HCPCS

## 2021-12-17 PROCEDURE — A9270 NON-COVERED ITEM OR SERVICE: HCPCS | Performed by: PHYSICIAN ASSISTANT

## 2021-12-17 PROCEDURE — P9016 RBC LEUKOCYTES REDUCED: HCPCS

## 2021-12-17 PROCEDURE — 99226 PR SBSQ OBSERVATION CARE/DAY 35 MINUTES: CPT | Performed by: INTERNAL MEDICINE

## 2021-12-17 PROCEDURE — 63710000001 PEG-KCL-NACL-NASULF-NA ASC-C 100 G RECONSTITUTED SOLUTION: Performed by: PHYSICIAN ASSISTANT

## 2021-12-17 PROCEDURE — 63710000001 MULTIVITAMIN WITH MINERALS TABLET: Performed by: INTERNAL MEDICINE

## 2021-12-17 PROCEDURE — 63710000001 FLECAINIDE 50 MG TABLET: Performed by: NURSE PRACTITIONER

## 2021-12-17 PROCEDURE — 63710000001 METOPROLOL TARTRATE 50 MG TABLET: Performed by: INTERNAL MEDICINE

## 2021-12-17 PROCEDURE — 36430 TRANSFUSION BLD/BLD COMPNT: CPT

## 2021-12-17 PROCEDURE — 99204 OFFICE O/P NEW MOD 45 MIN: CPT | Performed by: PHYSICIAN ASSISTANT

## 2021-12-17 PROCEDURE — 86900 BLOOD TYPING SEROLOGIC ABO: CPT

## 2021-12-17 PROCEDURE — 85027 COMPLETE CBC AUTOMATED: CPT | Performed by: NURSE PRACTITIONER

## 2021-12-17 PROCEDURE — 85007 BL SMEAR W/DIFF WBC COUNT: CPT | Performed by: NURSE PRACTITIONER

## 2021-12-17 PROCEDURE — 85018 HEMOGLOBIN: CPT | Performed by: INTERNAL MEDICINE

## 2021-12-17 PROCEDURE — 96376 TX/PRO/DX INJ SAME DRUG ADON: CPT

## 2021-12-17 PROCEDURE — 63710000001 MULTIVITAMIN WITH MINERALS TABLET: Performed by: NURSE PRACTITIONER

## 2021-12-17 RX ORDER — METOPROLOL TARTRATE 50 MG/1
50 TABLET, FILM COATED ORAL EVERY 12 HOURS SCHEDULED
Status: DISCONTINUED | OUTPATIENT
Start: 2021-12-17 | End: 2021-12-17

## 2021-12-17 RX ORDER — PEG-3350, SODIUM SULFATE, SODIUM CHLORIDE, POTASSIUM CHLORIDE, SODIUM ASCORBATE AND ASCORBIC ACID 7.5-2.691G
1000 KIT ORAL
Status: COMPLETED | OUTPATIENT
Start: 2021-12-17 | End: 2021-12-17

## 2021-12-17 RX ORDER — LOSARTAN POTASSIUM 25 MG/1
25 TABLET ORAL
Status: DISCONTINUED | OUTPATIENT
Start: 2021-12-17 | End: 2021-12-19 | Stop reason: HOSPADM

## 2021-12-17 RX ORDER — METOPROLOL TARTRATE 50 MG/1
50 TABLET, FILM COATED ORAL EVERY 12 HOURS
Status: DISCONTINUED | OUTPATIENT
Start: 2021-12-17 | End: 2021-12-18

## 2021-12-17 RX ORDER — PEG-3350, SODIUM SULFATE, SODIUM CHLORIDE, POTASSIUM CHLORIDE, SODIUM ASCORBATE AND ASCORBIC ACID 7.5-2.691G
1000 KIT ORAL
Status: COMPLETED | OUTPATIENT
Start: 2021-12-18 | End: 2021-12-18

## 2021-12-17 RX ADMIN — PANTOPRAZOLE SODIUM 40 MG: 40 INJECTION, POWDER, FOR SOLUTION INTRAVENOUS at 09:35

## 2021-12-17 RX ADMIN — FLECAINIDE ACETATE 100 MG: 50 TABLET ORAL at 09:35

## 2021-12-17 RX ADMIN — METOPROLOL TARTRATE 50 MG: 50 TABLET, FILM COATED ORAL at 17:17

## 2021-12-17 RX ADMIN — SODIUM CHLORIDE, PRESERVATIVE FREE 10 ML: 5 INJECTION INTRAVENOUS at 22:13

## 2021-12-17 RX ADMIN — SODIUM CHLORIDE, PRESERVATIVE FREE 10 ML: 5 INJECTION INTRAVENOUS at 09:37

## 2021-12-17 RX ADMIN — POLYETHYLENE GLYCOL 3350, SODIUM SULFATE, SODIUM CHLORIDE, POTASSIUM CHLORIDE, SODIUM ASCORBATE, AND ASCORBIC ACID 1000 ML: KIT at 17:00

## 2021-12-17 RX ADMIN — PANTOPRAZOLE SODIUM 40 MG: 40 INJECTION, POWDER, FOR SOLUTION INTRAVENOUS at 20:57

## 2021-12-17 RX ADMIN — FLECAINIDE ACETATE 100 MG: 50 TABLET ORAL at 20:57

## 2021-12-17 RX ADMIN — MULTIPLE VITAMINS W/ MINERALS TAB 1 TABLET: TAB at 09:35

## 2021-12-17 NOTE — PROGRESS NOTES
[facility] Medicine Services  PROGRESS NOTE    Patient Name: Vy Dao  : 1940  MRN: [id number]    Date of Admission: 2021  Primary Care Physician: Shima Jennings MD    Subjective   Subjective     CC:  Anemia    HPI:  Somewhat of a poor historian, states she has had bleeding for the last several days.  Prior history of light bleeding from her hemorrhoids.  She received 2 units of blood overnight    ROS:  Gen- No fevers, chills  CV- No chest pain, palpitations  Resp- No cough, dyspnea  GI- No N/V/D, mild abd pain       Objective   Objective     Vital Signs:   Temp:  [97.9 °F (36.6 °C)-99.8 °F (37.7 °C)] 98.2 °F (36.8 °C)  Heart Rate:  [] 70  Resp:  [15-20] 16  BP: (107-149)/(54-93) 127/83     Physical Exam:  Constitutional: No acute distress, awake, alert  HENT: NCAT, mucous membranes moist  Respiratory: Clear to auscultation bilaterally, respiratory effort normal   Cardiovascular: IRRR, no murmurs, rubs, or gallops  Gastrointestinal: soft, nontender, nondistended  Musculoskeletal: trace edema  Psychiatric: Appropriate affect, cooperative  Neurologic: Oriented x 2-3, poor historian, speech clear  Skin: No rashes      Results Reviewed:  LAB RESULTS:      Lab 21  0420 21  2100 21  1243   WBC 6.57  --  11.72*   HEMOGLOBIN 9.5* 6.7* 7.8*   HEMATOCRIT 28.8*  --  22.9*   PLATELETS 200  --  279   NEUTROS ABS 4.20  --  9.83*   IMMATURE GRANS (ABS)  --   --  0.06*   LYMPHS ABS  --   --  1.11   MONOS ABS  --   --  0.69   EOS ABS 0.07  --  0.00   MCV 92.9  --  93.5         Lab 21  0420 21  1243   SODIUM 141 131*   POTASSIUM 4.8 4.4   CHLORIDE 108* 97*   CO2 25.0 24.0   ANION GAP 8.0 10.0   BUN 25* 39*   CREATININE 0.83 1.22*   GLUCOSE 95 154*   CALCIUM 8.5* 8.8         Lab 21  1243   TOTAL PROTEIN 6.2   ALBUMIN 3.90   GLOBULIN 2.3   ALT (SGPT) 15   AST (SGOT) 18   BILIRUBIN 0.3   ALK PHOS 64         Lab 21  1243   PROBNP 2,576.0*    TROPONIN T <0.010             Lab 12/16/21  1723 12/16/21  1245 12/16/21  1245   ABO TYPING O   < > O   RH TYPING Positive   < > Positive   ANTIBODY SCREEN  --   --  Negative    < > = values in this interval not displayed.         Brief Urine Lab Results  (Last result in the past 365 days)      Color   Clarity   Blood   Leuk Est   Nitrite   Protein   CREAT   Urine HCG        12/16/21 1607 Yellow   Clear   Negative   Negative   Negative   Negative                 Microbiology Results Abnormal     Procedure Component Value - Date/Time    Respiratory Panel PCR w/COVID-19(SARS-CoV-2) MELISSA/EDVIN/TIANNA/PAD/COR/MAD/DOMINIQUE In-House, NP Swab in UTM/VTM, 3-4 HR TAT - Swab, Nasopharynx [[id number]]  (Normal) Collected: 12/16/21 1555    Lab Status: Final result Specimen: Swab from Nasopharynx Updated: 12/16/21 1728     ADENOVIRUS, PCR Not Detected     Coronavirus 229E Not Detected     Coronavirus HKU1 Not Detected     Coronavirus NL63 Not Detected     Coronavirus OC43 Not Detected     COVID19 Not Detected     Human Metapneumovirus Not Detected     Human Rhinovirus/Enterovirus Not Detected     Influenza A PCR Not Detected     Influenza B PCR Not Detected     Parainfluenza Virus 1 Not Detected     Parainfluenza Virus 2 Not Detected     Parainfluenza Virus 3 Not Detected     Parainfluenza Virus 4 Not Detected     RSV, PCR Not Detected     Bordetella pertussis pcr Not Detected     Bordetella parapertussis PCR Not Detected     Chlamydophila pneumoniae PCR Not Detected     Mycoplasma pneumo by PCR Not Detected    Narrative:      In the setting of a positive respiratory panel with a viral infection PLUS a negative procalcitonin without other underlying concern for bacterial infection, consider observing off antibiotics or discontinuation of antibiotics and continue supportive care. If the respiratory panel is positive for atypical bacterial infection (Bordetella pertussis, Chlamydophila pneumoniae, or Mycoplasma pneumoniae), consider  antibiotic de-escalation to target atypical bacterial infection.          CT Head Without Contrast    Result Date: 12/16/2021  EXAMINATION: CT HEAD WO CONTRAST-12/16/2021:  INDICATION: Dizzy, GI bleed.  TECHNIQUE: CT head without intravenous contrast administration.  The radiation dose reduction device was turned on for each scan per the ALARA (As Low as Reasonably Achievable) protocol.  COMPARISON: NONE.  FINDINGS: The midline structures are symmetric without evidence of mass, mass effect or midline shift. Ventricles in normal limits, however, sulci moderately prominent towards the vertex of at least mild-to-moderate generalized atrophy and/or age-related volume loss. No intra-axial hemorrhage or extra-axial fluid collection. Globes and orbits are unremarkable. The paranasal sinuses and mastoid air cells are grossly clear and well pneumatized. Calvarium intact.      Impression: No acute intracranial findings with mild-to-moderate senescent changes.  D:  12/16/2021 E:  12/16/2021    This report was finalized on 12/16/2021 4:40 PM by Dr. Akash Gramajo.      CT Abdomen Pelvis With Contrast    Result Date: 12/16/2021  EXAMINATION: CT ABDOMEN AND PELVIS W CONTRAST-12/16/2021:  INDICATION: Bloody stool.  TECHNIQUE: CT abdomen and pelvis with intravenous contrast.  The radiation dose reduction device was turned on for each scan per the ALARA (As Low as Reasonably Achievable) protocol.  COMPARISON: NONE.  FINDINGS: The lung bases demonstrate minimal subsegmental atelectasis without consolidation or effusion. The liver demonstrates diffuse low-attenuation of hepatic steatosis with several small subcentimeter areas of low attenuation consistent of hepatic cysts although too small to characterize without dominant focus separate. The gallbladder contains numerous stones in the dependent portion of cholelithiasis without wall thickening. No biliary dilatation. Pancreas and spleen unremarkable. Adrenals without distinct nodule.  Kidneys without hydronephrosis or hydroureter. No bulky retroperitoneal adenopathy. Atherosclerotic, nonaneurysmal, patent abdominal aorta with patent celiac and SMA origins. Portal veins and IVC patent. GI tract evaluation reveals small hiatal hernia. No disproportionate dilatation of bowel to suggest mechanical obstructive process. Pancolonic diverticulosis without evidence for acute diverticulitis. No free fluid or intra-abdominal free air. Pelvic viscera unremarkable without bulky pelvic adenopathy or free fluid. Degenerative changes of the spine and pelvis without aggressive osseous lesion. No soft tissue body wall findings of acuity.      Impression: No focal inflammatory findings of bowel or hyperdense signal within the bowel to suggest blood products. No perforation or abscess.  D:  12/16/2021 E:  12/16/2021  This report was finalized on 12/16/2021 4:40 PM by Dr. Akash Gramajo.      XR Chest 1 View    Result Date: 12/16/2021  EXAMINATION: XR CHEST 1 VW-12/16/2021:  INDICATION: GI bleed.  COMPARISON: NONE.  FINDINGS: Single view of the chest reveals cardiac size borderline enlarged without overt edema. No pneumothorax or pleural effusion. Chronic changes. No consolidation.      Impression: No acute cardiopulmonary process.  D:  12/16/2021 E:  12/16/2021  This report was finalized on 12/16/2021 4:39 PM by Dr. Akash Gramajo.            I have reviewed the medications:  Scheduled Meds:flecainide, 100 mg, Oral, BID  multivitamin with minerals, 1 tablet, Oral, Daily  pantoprazole, 40 mg, Intravenous, Q12H  sodium chloride, 10 mL, Intravenous, Q12H      Continuous Infusions:   PRN Meds:.•  acetaminophen **OR** acetaminophen **OR** acetaminophen  •  ondansetron **OR** ondansetron  •  sodium chloride  •  sodium chloride    Assessment/Plan   Assessment & Plan     Active Hospital Problems    Diagnosis  POA   • Lower GI bleed [K92.2]  Yes   • A-fib (HCC) [I48.91]  Yes   • HTN (hypertension) [I10]  Yes   • HLD  (hyperlipidemia) [E78.5]  Yes      Resolved Hospital Problems   No resolved problems to display.        Brief Hospital Course to date:  Vy Dao is a 81 y.o. female with medical history significant for A. fib, recently started on Xarelto as anticoagulation, as well as flecainide, hypertension and dyslipidemia.  She developed bright red blood per rectum over the last 2 days and started to become symptomatic with dizziness upon standing.  Hemoglobin on admission 6.7.  She received 2 units of blood on admit    Lower GI bleed   Symptomatic anemia   --Following, plan for colonoscopy in a.m.  --Repeat CBC improved  --Continue IV Protonix  --clear liquid diet, npo at midnight     AFIB  --Xarelto on hold  -- cont flecainide   -- follows with Cardiologist in [place] but may want to switch over care here  --pending results of colonoscopy, ? risk versus benefit in an 81-year-old lady to continue anticoagulation     HTN  HLD  --Blood pressure on the higher side, renal function better, okay to start losartan 25 mg and titrate as needed  --check renal panel in AM     FRANDY  - resolved  --recheck in AM     DVT prophylaxis:  Medical and mechanical DVT prophylaxis orders are present.       AM-PAC 6 Clicks Score (PT): 20 (12/16/21 2000)    Disposition: I expect the patient to be discharged in the next few days, will need PT eval once hemoglobin/bleeding stabilized    CODE STATUS:   Code Status and Medical Interventions:   Ordered at: 12/16/21 2260     Level Of Support Discussed With:    Patient     Code Status (Patient has no pulse and is not breathing):    CPR (Attempt to Resuscitate)     Medical Interventions (Patient has pulse or is breathing):    Full Support       Maia Vásquez, DO  12/17/21

## 2021-12-17 NOTE — CONSULTS
[facility]    Referring Provider: Abraham Prado MD   PCP: Shima Jennings MD    Reason for Consultation: Bright red blood per rectum     Chief complaint: Bloody stools     History of present illness:    Vy Dao is a 81 y.o. female who is admitted with bright red blood per rectum.  She was recently diagnosed with atrial fibrillation and began anticoagulation with Xarelto three weeks ago.  She began noticing bright red blood in her stools shortly after that but thought it was hemorrhoidal source.   She gave herself a suppository and bleeding appeared to have resolved.  This however reoccurred yesterday in larger quantity.  She has had associated dizziness and lightheadedness.  She has fecal urgency and mild cramping prior to a bowel movement.  She otherwise denies abdominal pain.      She has had several colonoscopies in the past with Dr. Schulte at [facility] due to colonic polyps.  Her last colonoscopy was 1-2 years ago.      Hgb was decreased to 6.7.   MCV is 93.  She received 2 units of PRBCs yesterday.      Allergies:  Amoxicillin    Scheduled Meds:  flecainide, 100 mg, Oral, BID  multivitamin with minerals, 1 tablet, Oral, Daily  pantoprazole, 40 mg, Intravenous, Q12H  sodium chloride, 10 mL, Intravenous, Q12H         Infusions:       PRN Meds:  •  acetaminophen **OR** acetaminophen **OR** acetaminophen  •  ondansetron **OR** ondansetron  •  sodium chloride  •  sodium chloride    Home Meds:  Medications Prior to Admission   Medication Sig Dispense Refill Last Dose   • Ascorbic Acid (Vitamin C) 100 MG chewable tablet Chew.      • atenolol (TENORMIN) 50 MG tablet Take 50 mg by mouth Daily.      • Coenzyme Q10 (CO Q 10 PO) Take  by mouth.      • dilTIAZem LA (Matzim LA) 240 MG 24 hr tablet Take 240 mg by mouth Daily.      • flecainide (TAMBOCOR) 100 MG tablet Take 100 mg by mouth 2 (Two) Times a Day.      • hydroCHLOROthiazide (HYDRODIURIL) 25 MG tablet Take 25 mg by mouth Daily.      •  "multivitamin with minerals (MULTIVITAMIN ADULT PO) Take 1 tablet by mouth Daily.      • Nutritional Supplements (Glucosamine Complex) tablet Take  by mouth.      • OLMESARTAN MEDOXOMIL PO Take 40 mg by mouth.      • Omega-3 Fatty Acids (OMEGA-3 PO) Take  by mouth.      • prazosin (MINIPRESS) 1 MG capsule Take 1 mg by mouth Every Night.      • rivaroxaban (XARELTO) 20 MG tablet Take 20 mg by mouth Daily.      • VITAMIN D, CHOLECALCIFEROL, PO Take  by mouth.          ROS: Review of Systems   Constitutional: Positive for fatigue.   HENT: Negative.    Eyes: Negative.    Respiratory: Negative.    Cardiovascular: Negative.    Gastrointestinal: Positive for blood in stool. Negative for abdominal pain, nausea and vomiting.   Endocrine: Negative.    Genitourinary: Negative.    Musculoskeletal: Negative.    Skin: Negative.    Allergic/Immunologic: Negative.    Neurological: Positive for dizziness, weakness and light-headedness.   Hematological: Negative.    Psychiatric/Behavioral: Negative.        PAST MED HX:  Past Medical History:   Diagnosis Date   • Atrial fibrillation (HCC)    • Hypertension    • Urinary tract infection        PAST SURG HX:  History reviewed. No pertinent surgical history.    FAM HX:  Family History   Problem Relation Age of Onset   • Heart disease Father    • Cancer Sister    • Atrial fibrillation Sister        SOC HX:  Social History     Socioeconomic History   • Marital status:    Tobacco Use   • Smoking status: Former Smoker     Quit date: 1991     Years since quittin.0   • Smokeless tobacco: Never Used   Vaping Use   • Vaping Use: Never used   Substance and Sexual Activity   • Alcohol use: Never   • Drug use: Never       PHYSICAL EXAM  /87 (BP Location: Left arm, Patient Position: Lying)   Pulse 92   Temp 98.9 °F (37.2 °C) (Oral)   Resp 22   Ht 162.6 cm (64\")   Wt 62.1 kg (137 lb)   SpO2 95%   BMI 23.52 kg/m²   Wt Readings from Last 3 Encounters:   21 62.1 kg " (137 lb)   ,body mass index is 23.52 kg/m².  Physical Exam  Constitutional:       General: She is not in acute distress.  HENT:      Head: Normocephalic and atraumatic.      Mouth/Throat:      Mouth: Mucous membranes are moist.   Eyes:      General: No scleral icterus.  Cardiovascular:      Rate and Rhythm: Normal rate and regular rhythm.   Pulmonary:      Effort: Pulmonary effort is normal.      Breath sounds: Normal breath sounds.   Abdominal:      General: Bowel sounds are normal. There is no distension.      Palpations: Abdomen is soft.      Tenderness: There is no abdominal tenderness.   Musculoskeletal:      Right lower leg: No edema.      Left lower leg: No edema.   Skin:     General: Skin is warm and dry.   Neurological:      Mental Status: She is alert and oriented to person, place, and time.   Psychiatric:         Behavior: Behavior normal.       Results Review:   I reviewed the patient's new clinical results.    Lab Results   Component Value Date    WBC 6.57 12/17/2021    HGB 9.5 (L) 12/17/2021    HGB 6.7 (C) 12/16/2021    HGB 7.8 (L) 12/16/2021    HCT 28.8 (L) 12/17/2021    MCV 92.9 12/17/2021     12/17/2021       No results found for: INR    Lab Results   Component Value Date    GLUCOSE 95 12/17/2021    BUN 25 (H) 12/17/2021    CREATININE 0.83 12/17/2021    EGFRIFNONA 66 12/17/2021    BCR 30.1 (H) 12/17/2021     12/17/2021    K 4.8 12/17/2021    CO2 25.0 12/17/2021    CALCIUM 8.5 (L) 12/17/2021    ALBUMIN 3.90 12/16/2021    ALKPHOS 64 12/16/2021    BILITOT 0.3 12/16/2021    ALT 15 12/16/2021    AST 18 12/16/2021     CT Abdomen/Pelvis (as interpreted by radiologist)  FINDINGS: The lung bases demonstrate minimal subsegmental atelectasis  without consolidation or effusion. The liver demonstrates diffuse  low-attenuation of hepatic steatosis with several small subcentimeter  areas of low attenuation consistent of hepatic cysts although too small  to characterize without dominant focus separate.  The gallbladder  contains numerous stones in the dependent portion of cholelithiasis  without wall thickening. No biliary dilatation. Pancreas and spleen  unremarkable. Adrenals without distinct nodule. Kidneys without  hydronephrosis or hydroureter. No bulky retroperitoneal adenopathy.  Atherosclerotic, nonaneurysmal, patent abdominal aorta with patent  celiac and SMA origins. Portal veins and IVC patent. GI tract evaluation  reveals small hiatal hernia. No disproportionate dilatation of bowel to  suggest mechanical obstructive process. Pancolonic diverticulosis  without evidence for acute diverticulitis. No free fluid or  intra-abdominal free air. Pelvic viscera unremarkable without bulky  pelvic adenopathy or free fluid. Degenerative changes of the spine and  pelvis without aggressive osseous lesion. No soft tissue body wall  findings of acuity.   IMPRESSION:  No focal inflammatory findings of bowel or hyperdense signal  within the bowel to suggest blood products. No perforation or abscess.    ASSESSMENTS/PLANS    1. Bright red blood per rectum   2. Acute blood loss anemia s/p transfusion of 2 units of PRBCs   3. Anticoagulation use, Xarelto   4. History of colon polyps     >>> Recommend colonoscopy tomorrow, 12/18/21  >>> Split dose Moviprep to begin this afternoon   >>> Clear liquid diet.  NPO at midnight   >>> Hold Xarelto  >>> Serial H&H     I discussed the patient's findings and my recommendations with patient    MARGY Duval  12/17/21  13:49 EST

## 2021-12-17 NOTE — CASE MANAGEMENT/SOCIAL WORK
Discharge Planning Assessment  [facility]     Patient Name: Vy Dao  MRN: [id number]  Today's Date: 12/17/2021    Admit Date: 12/16/2021     Discharge Needs Assessment     Row Name 12/17/21 1104       Living Environment    Lives With alone    Current Living Arrangements home/apartment/condo    Primary Care Provided by self    Provides Primary Care For no one    Family Caregiver if Needed none    Able to Return to Prior Arrangements yes    Living Arrangement Comments plan is home       Transition Planning    Patient/Family Anticipates Transition to home    Transportation Anticipated family or friend will provide       Discharge Needs Assessment    Equipment Currently Used at Home none    Concerns to be Addressed no discharge needs identified    Equipment Needed After Discharge none    Discharge Coordination/Progress plan home               Discharge Plan     Row Name 12/17/21 1105       Plan    Plan home    Patient/Family in Agreement with Plan yes    Plan Comments I talked with patient at bedside. Patient lives alone in [place]. Independent PTA, no DME and plan is home. Patient's sister will take her home. Nancie @ [phone]    Final Discharge Disposition Code 01 - home or self-care              Continued Care and Services - Admitted Since 12/16/2021    Coordination has not been started for this encounter.          Demographic Summary     Row Name 12/17/21 1102       General Information    Referral Source admission list    Reason for Consult discharge planning    Preferred Language English     Used During This Interaction no       Contact Information    Permission Granted to Share Info With     Contact Information Obtained for     Contact Information Comments PCP: Shima Jennings               Functional Status     Row Name 12/17/21 1103       Functional Status    Usual Activity Tolerance moderate    Current Activity Tolerance moderate       Functional Status, IADL    Medications  independent    Meal Preparation independent    Housekeeping independent    Laundry independent    Shopping independent    IADL Comments has coverage for meds       Mental Status    General Appearance WDL WDL       Mental Status Summary    Recent Changes in Mental Status/Cognitive Functioning ability to speak clearly; ability to understand       Employment/    Employment Status retired               Psychosocial    No documentation.                Abuse/Neglect    No documentation.                Legal    No documentation.                Substance Abuse    No documentation.                Patient Forms    No documentation.                   Brittany Orantes RN

## 2021-12-18 ENCOUNTER — ANESTHESIA (OUTPATIENT)
Dept: GASTROENTEROLOGY | Facility: HOSPITAL | Age: 81
End: 2021-12-18

## 2021-12-18 ENCOUNTER — ANESTHESIA EVENT (OUTPATIENT)
Dept: GASTROENTEROLOGY | Facility: HOSPITAL | Age: 81
End: 2021-12-18

## 2021-12-18 LAB
ALBUMIN SERPL-MCNC: 3.5 G/DL (ref 3.5–5.2)
ANION GAP SERPL CALCULATED.3IONS-SCNC: 7 MMOL/L (ref 5–15)
BASOPHILS # BLD AUTO: 0.04 10*3/MM3 (ref 0–0.2)
BASOPHILS NFR BLD AUTO: 0.6 % (ref 0–1.5)
BH BB BLOOD EXPIRATION DATE: NORMAL
BH BB BLOOD EXPIRATION DATE: NORMAL
BH BB BLOOD TYPE BARCODE: 5100
BH BB BLOOD TYPE BARCODE: 5100
BH BB DISPENSE STATUS: NORMAL
BH BB DISPENSE STATUS: NORMAL
BH BB PRODUCT CODE: NORMAL
BH BB PRODUCT CODE: NORMAL
BH BB UNIT NUMBER: NORMAL
BH BB UNIT NUMBER: NORMAL
BUN SERPL-MCNC: 17 MG/DL (ref 8–23)
BUN/CREAT SERPL: 18.3 (ref 7–25)
CALCIUM SPEC-SCNC: 8.9 MG/DL (ref 8.6–10.5)
CHLORIDE SERPL-SCNC: 112 MMOL/L (ref 98–107)
CO2 SERPL-SCNC: 25 MMOL/L (ref 22–29)
CREAT SERPL-MCNC: 0.93 MG/DL (ref 0.57–1)
CROSSMATCH INTERPRETATION: NORMAL
CROSSMATCH INTERPRETATION: NORMAL
DEPRECATED RDW RBC AUTO: 48.7 FL (ref 37–54)
EOSINOPHIL # BLD AUTO: 0.15 10*3/MM3 (ref 0–0.4)
EOSINOPHIL NFR BLD AUTO: 2.1 % (ref 0.3–6.2)
ERYTHROCYTE [DISTWIDTH] IN BLOOD BY AUTOMATED COUNT: 14.1 % (ref 12.3–15.4)
GFR SERPL CREATININE-BSD FRML MDRD: 58 ML/MIN/1.73
GLUCOSE SERPL-MCNC: 105 MG/DL (ref 65–99)
HCT VFR BLD AUTO: 30.4 % (ref 34–46.6)
HGB BLD-MCNC: 9.8 G/DL (ref 12–15.9)
IMM GRANULOCYTES # BLD AUTO: 0.02 10*3/MM3 (ref 0–0.05)
IMM GRANULOCYTES NFR BLD AUTO: 0.3 % (ref 0–0.5)
LYMPHOCYTES # BLD AUTO: 1.12 10*3/MM3 (ref 0.7–3.1)
LYMPHOCYTES NFR BLD AUTO: 16 % (ref 19.6–45.3)
MCH RBC QN AUTO: 30.4 PG (ref 26.6–33)
MCHC RBC AUTO-ENTMCNC: 32.2 G/DL (ref 31.5–35.7)
MCV RBC AUTO: 94.4 FL (ref 79–97)
MONOCYTES # BLD AUTO: 0.68 10*3/MM3 (ref 0.1–0.9)
MONOCYTES NFR BLD AUTO: 9.7 % (ref 5–12)
NEUTROPHILS NFR BLD AUTO: 5.01 10*3/MM3 (ref 1.7–7)
NEUTROPHILS NFR BLD AUTO: 71.3 % (ref 42.7–76)
NRBC BLD AUTO-RTO: 0 /100 WBC (ref 0–0.2)
PHOSPHATE SERPL-MCNC: 3 MG/DL (ref 2.5–4.5)
PLATELET # BLD AUTO: 234 10*3/MM3 (ref 140–450)
PMV BLD AUTO: 9.4 FL (ref 6–12)
POTASSIUM SERPL-SCNC: 5.2 MMOL/L (ref 3.5–5.2)
RBC # BLD AUTO: 3.22 10*6/MM3 (ref 3.77–5.28)
SODIUM SERPL-SCNC: 144 MMOL/L (ref 136–145)
UNIT  ABO: NORMAL
UNIT  ABO: NORMAL
UNIT  RH: NORMAL
UNIT  RH: NORMAL
WBC NRBC COR # BLD: 7.02 10*3/MM3 (ref 3.4–10.8)

## 2021-12-18 PROCEDURE — G0378 HOSPITAL OBSERVATION PER HR: HCPCS

## 2021-12-18 PROCEDURE — A9270 NON-COVERED ITEM OR SERVICE: HCPCS | Performed by: INTERNAL MEDICINE

## 2021-12-18 PROCEDURE — 85025 COMPLETE CBC W/AUTO DIFF WBC: CPT | Performed by: INTERNAL MEDICINE

## 2021-12-18 PROCEDURE — 96376 TX/PRO/DX INJ SAME DRUG ADON: CPT

## 2021-12-18 PROCEDURE — 80069 RENAL FUNCTION PANEL: CPT | Performed by: INTERNAL MEDICINE

## 2021-12-18 PROCEDURE — 63710000001 HYDROCORTISONE 25 MG SUPPOSITORY: Performed by: INTERNAL MEDICINE

## 2021-12-18 PROCEDURE — 25010000002 PROPOFOL 10 MG/ML EMULSION: Performed by: NURSE ANESTHETIST, CERTIFIED REGISTERED

## 2021-12-18 PROCEDURE — 45378 DIAGNOSTIC COLONOSCOPY: CPT | Performed by: INTERNAL MEDICINE

## 2021-12-18 PROCEDURE — 63710000001 FLECAINIDE 50 MG TABLET: Performed by: INTERNAL MEDICINE

## 2021-12-18 PROCEDURE — 99226 PR SBSQ OBSERVATION CARE/DAY 35 MINUTES: CPT | Performed by: INTERNAL MEDICINE

## 2021-12-18 RX ORDER — ONDANSETRON 2 MG/ML
4 INJECTION INTRAMUSCULAR; INTRAVENOUS ONCE AS NEEDED
Status: DISCONTINUED | OUTPATIENT
Start: 2021-12-18 | End: 2021-12-18 | Stop reason: HOSPADM

## 2021-12-18 RX ORDER — HYDROCORTISONE ACETATE 25 MG/1
25 SUPPOSITORY RECTAL 2 TIMES DAILY
Status: DISCONTINUED | OUTPATIENT
Start: 2021-12-18 | End: 2021-12-19 | Stop reason: HOSPADM

## 2021-12-18 RX ORDER — SODIUM CHLORIDE, SODIUM LACTATE, POTASSIUM CHLORIDE, CALCIUM CHLORIDE 600; 310; 30; 20 MG/100ML; MG/100ML; MG/100ML; MG/100ML
INJECTION, SOLUTION INTRAVENOUS CONTINUOUS PRN
Status: DISCONTINUED | OUTPATIENT
Start: 2021-12-18 | End: 2021-12-18 | Stop reason: SURG

## 2021-12-18 RX ORDER — PROPOFOL 10 MG/ML
VIAL (ML) INTRAVENOUS AS NEEDED
Status: DISCONTINUED | OUTPATIENT
Start: 2021-12-18 | End: 2021-12-18 | Stop reason: SURG

## 2021-12-18 RX ORDER — LIDOCAINE HYDROCHLORIDE 10 MG/ML
INJECTION, SOLUTION EPIDURAL; INFILTRATION; INTRACAUDAL; PERINEURAL AS NEEDED
Status: DISCONTINUED | OUTPATIENT
Start: 2021-12-18 | End: 2021-12-18 | Stop reason: SURG

## 2021-12-18 RX ADMIN — PANTOPRAZOLE SODIUM 40 MG: 40 INJECTION, POWDER, FOR SOLUTION INTRAVENOUS at 20:34

## 2021-12-18 RX ADMIN — HYDROCORTISONE ACETATE 25 MG: 25 SUPPOSITORY RECTAL at 20:42

## 2021-12-18 RX ADMIN — PROPOFOL 60 MG: 10 INJECTION, EMULSION INTRAVENOUS at 11:00

## 2021-12-18 RX ADMIN — POLYETHYLENE GLYCOL 3350, SODIUM SULFATE, SODIUM CHLORIDE, POTASSIUM CHLORIDE, SODIUM ASCORBATE, AND ASCORBIC ACID 1000 ML: KIT at 04:25

## 2021-12-18 RX ADMIN — SODIUM CHLORIDE, PRESERVATIVE FREE 10 ML: 5 INJECTION INTRAVENOUS at 20:34

## 2021-12-18 RX ADMIN — FLECAINIDE ACETATE 100 MG: 50 TABLET ORAL at 09:26

## 2021-12-18 RX ADMIN — SODIUM CHLORIDE, PRESERVATIVE FREE 10 ML: 5 INJECTION INTRAVENOUS at 09:26

## 2021-12-18 RX ADMIN — SODIUM CHLORIDE, POTASSIUM CHLORIDE, SODIUM LACTATE AND CALCIUM CHLORIDE: 600; 310; 30; 20 INJECTION, SOLUTION INTRAVENOUS at 10:58

## 2021-12-18 RX ADMIN — METOPROLOL TARTRATE 50 MG: 50 TABLET, FILM COATED ORAL at 05:25

## 2021-12-18 RX ADMIN — LIDOCAINE HYDROCHLORIDE 50 MG: 10 INJECTION, SOLUTION EPIDURAL; INFILTRATION; INTRACAUDAL; PERINEURAL at 11:00

## 2021-12-18 RX ADMIN — PANTOPRAZOLE SODIUM 40 MG: 40 INJECTION, POWDER, FOR SOLUTION INTRAVENOUS at 09:26

## 2021-12-18 RX ADMIN — FLECAINIDE ACETATE 100 MG: 50 TABLET ORAL at 20:34

## 2021-12-18 RX ADMIN — LOSARTAN POTASSIUM 25 MG: 25 TABLET, FILM COATED ORAL at 09:26

## 2021-12-18 RX ADMIN — PROPOFOL 100 MCG/KG/MIN: 10 INJECTION, EMULSION INTRAVENOUS at 11:00

## 2021-12-18 RX ADMIN — MULTIPLE VITAMINS W/ MINERALS TAB 1 TABLET: TAB at 09:26

## 2021-12-18 NOTE — ANESTHESIA POSTPROCEDURE EVALUATION
Patient: Vy Dao    Procedure Summary     Date: 12/18/21 Room / Location: [facility] ENDOSCOPY 3 /  [facility]    Anesthesia Start: 1058 Anesthesia Stop:     Procedure: COLONOSCOPY (N/A ) Diagnosis:       Gastrointestinal hemorrhage, unspecified gastrointestinal hemorrhage type      (Gastrointestinal hemorrhage, unspecified gastrointestinal hemorrhage type [K92.2])    Surgeons: Brunner, Mark I, MD Provider: Ramesh Negro Jr., MD    Anesthesia Type: general ASA Status: 3          Anesthesia Type: general    Vitals  No vitals data found for the desired time range.          Post Anesthesia Care and Evaluation    Patient location during evaluation: PACU  Patient participation: complete - patient participated  Level of consciousness: awake and alert  Pain management: adequate  Airway patency: patent  Anesthetic complications: No anesthetic complications  PONV Status: none  Cardiovascular status: hemodynamically stable and acceptable  Respiratory status: nonlabored ventilation, acceptable and nasal cannula  Hydration status: acceptable

## 2021-12-18 NOTE — ANESTHESIA PREPROCEDURE EVALUATION
Anesthesia Evaluation     Patient summary reviewed and Nursing notes reviewed   no history of anesthetic complications:  NPO Solid Status: > 8 hours  NPO Liquid Status: > 2 hours           Airway   Mallampati: II  Dental - normal exam     Pulmonary - negative pulmonary ROS and normal exam   Cardiovascular - normal exam    ECG reviewed    (+) hypertension, dysrhythmias (on xarelto (HELD for GI bleed)) Atrial Fib, hyperlipidemia,       Neuro/Psych- negative ROS  (-) seizures, CVA  GI/Hepatic/Renal/Endo    (+)  GI bleeding lower active bleeding, renal disease (unknown baseline) CRI,     Musculoskeletal     Abdominal    Substance History      OB/GYN          Other        ROS/Med Hx Other: Hct 30                Anesthesia Plan    ASA 3     general     intravenous induction     Anesthetic plan, all risks, benefits, and alternatives have been provided, discussed and informed consent has been obtained with: patient.

## 2021-12-18 NOTE — PLAN OF CARE
Problem: Fall Injury Risk  Goal: Absence of Fall and Fall-Related Injury  Outcome: Ongoing, Progressing  Intervention: Identify and Manage Contributors to Fall Injury Risk  Recent Flowsheet Documentation  Taken 12/18/2021 0200 by Gayathri Rivero RN  Medication Review/Management: medications reviewed  Taken 12/18/2021 0000 by Gayathri Rivero RN  Medication Review/Management: medications reviewed  Taken 12/17/2021 2200 by Gayathri Rivero RN  Medication Review/Management: medications reviewed  Taken 12/17/2021 2000 by Gayathri Rivero RN  Medication Review/Management: medications reviewed  Intervention: Promote Injury-Free Environment  Recent Flowsheet Documentation  Taken 12/18/2021 0200 by Gayathri Rivero RN  Safety Promotion/Fall Prevention:   activity supervised   assistive device/personal items within reach   clutter free environment maintained   fall prevention program maintained   lighting adjusted   nonskid shoes/slippers when out of bed   room organization consistent   safety round/check completed   elopement precautions  Taken 12/18/2021 0000 by Gayathri Rivero RN  Safety Promotion/Fall Prevention:   activity supervised   assistive device/personal items within reach   clutter free environment maintained   elopement precautions   fall prevention program maintained   lighting adjusted   nonskid shoes/slippers when out of bed   room organization consistent   safety round/check completed  Taken 12/17/2021 2200 by Gayathri Rivero RN  Safety Promotion/Fall Prevention:   activity supervised   assistive device/personal items within reach   clutter free environment maintained   fall prevention program maintained   lighting adjusted   nonskid shoes/slippers when out of bed   room organization consistent   safety round/check completed   elopement precautions  Taken 12/17/2021 2000 by Gayathri Rivero RN  Safety Promotion/Fall Prevention:   activity supervised   assistive device/personal items within reach    clutter free environment maintained   fall prevention program maintained   elopement precautions   lighting adjusted   nonskid shoes/slippers when out of bed   room organization consistent   safety round/check completed   gait belt     Problem: Adult Inpatient Plan of Care  Goal: Plan of Care Review  Outcome: Ongoing, Progressing  Goal: Patient-Specific Goal (Individualized)  Outcome: Ongoing, Progressing  Goal: Absence of Hospital-Acquired Illness or Injury  Outcome: Ongoing, Progressing  Intervention: Identify and Manage Fall Risk  Recent Flowsheet Documentation  Taken 12/18/2021 0200 by Gayathri Rivero RN  Safety Promotion/Fall Prevention:   activity supervised   assistive device/personal items within reach   clutter free environment maintained   fall prevention program maintained   lighting adjusted   nonskid shoes/slippers when out of bed   room organization consistent   safety round/check completed   elopement precautions  Taken 12/18/2021 0000 by Gayathri Rivero RN  Safety Promotion/Fall Prevention:   activity supervised   assistive device/personal items within reach   clutter free environment maintained   elopement precautions   fall prevention program maintained   lighting adjusted   nonskid shoes/slippers when out of bed   room organization consistent   safety round/check completed  Taken 12/17/2021 2200 by Gayathri Rivero RN  Safety Promotion/Fall Prevention:   activity supervised   assistive device/personal items within reach   clutter free environment maintained   fall prevention program maintained   lighting adjusted   nonskid shoes/slippers when out of bed   room organization consistent   safety round/check completed   elopement precautions  Taken 12/17/2021 2000 by Gayathri Rivero RN  Safety Promotion/Fall Prevention:   activity supervised   assistive device/personal items within reach   clutter free environment maintained   fall prevention program maintained   elopement precautions   lighting  adjusted   nonskid shoes/slippers when out of bed   room organization consistent   safety round/check completed   gait belt  Intervention: Prevent Skin Injury  Recent Flowsheet Documentation  Taken 12/18/2021 0200 by Gayathri Rivero RN  Body Position: position changed independently  Taken 12/18/2021 0000 by Gayathri Rivero RN  Body Position: position changed independently  Taken 12/17/2021 2200 by Gayathri Rivero RN  Body Position: position changed independently  Taken 12/17/2021 2000 by Gayathri Rivero RN  Body Position: position changed independently  Intervention: Prevent and Manage VTE (venous thromboembolism) Risk  Recent Flowsheet Documentation  Taken 12/17/2021 2000 by Gayathri Rivero RN  VTE Prevention/Management:   dorsiflexion/plantar flexion performed   bilateral   bleeding risk factor(s) identified  Goal: Optimal Comfort and Wellbeing  Outcome: Ongoing, Progressing  Intervention: Provide Person-Centered Care  Recent Flowsheet Documentation  Taken 12/17/2021 2000 by Gayathri Rivero RN  Trust Relationship/Rapport:   care explained   thoughts/feelings acknowledged  Goal: Readiness for Transition of Care  Outcome: Ongoing, Progressing   Goal Outcome Evaluation:

## 2021-12-18 NOTE — PROGRESS NOTES
[facility] Medicine Services  PROGRESS NOTE    Patient Name: Vy Dao  : 1940  MRN: [id number]    Date of Admission: 2021  Primary Care Physician: Shima Jennings MD    Subjective   Subjective     CC:  Anemia    HPI:  Still having a little bit of blood with prep, yesterday evening had an episode where HR jumped into the 140s but asymptomatic.  She does not ever remember it happening when I asked her about it again today    ROS:  Gen- No fevers, chills  CV- No chest pain, palpitations  Resp- No cough, dyspnea      Objective   Objective     Vital Signs:   Temp:  [98.2 °F (36.8 °C)-98.9 °F (37.2 °C)] 98.7 °F (37.1 °C)  Heart Rate:  [] 86  Resp:  [18-22] 18  BP: (123-168)/() 148/99     Physical Exam:  Constitutional: No acute distress, awake, alert  HENT: NCAT, mucous membranes moist  Respiratory: Clear to auscultation bilaterally, respiratory effort normal   Cardiovascular: Irregular rhythm, controlled rate  Gastrointestinal: Positive bowel sounds, soft, nontender, nondistended  Musculoskeletal: No bilateral ankle edema  Psychiatric: Appropriate affect, cooperative  Neurologic: Oriented x 2-3, speech clear  Skin: No rashes      Results Reviewed:  LAB RESULTS:      Lab 21  2100 21  1243   WBC 7.02  --  6.57  --  11.72*   HEMOGLOBIN 9.8* 9.6* 9.5* 6.7* 7.8*   HEMATOCRIT 30.4*  --  28.8*  --  22.9*   PLATELETS 234  --  200  --  279   NEUTROS ABS 5.01  --  4.20  --  9.83*   IMMATURE GRANS (ABS) 0.02  --   --   --  0.06*   LYMPHS ABS 1.12  --   --   --  1.11   MONOS ABS 0.68  --   --   --  0.69   EOS ABS 0.15  --  0.07  --  0.00   MCV 94.4  --  92.9  --  93.5         Lab 21  1243   SODIUM 144 141 131*   POTASSIUM 5.2 4.8 4.4   CHLORIDE 112* 108* 97*   CO2 25.0 25.0 24.0   ANION GAP 7.0 8.0 10.0   BUN 17 25* 39*   CREATININE 0.93 0.83 1.22*   GLUCOSE 105* 95 154*   CALCIUM  8.9 8.5* 8.8   PHOSPHORUS 3.0  --   --          Lab 12/18/21  0828 12/16/21  1243   TOTAL PROTEIN  --  6.2   ALBUMIN 3.50 3.90   GLOBULIN  --  2.3   ALT (SGPT)  --  15   AST (SGOT)  --  18   BILIRUBIN  --  0.3   ALK PHOS  --  64         Lab 12/16/21  1243   PROBNP 2,576.0*   TROPONIN T <0.010             Lab 12/16/21  1723 12/16/21  1245 12/16/21  1245   ABO TYPING O   < > O   RH TYPING Positive   < > Positive   ANTIBODY SCREEN  --   --  Negative    < > = values in this interval not displayed.         Brief Urine Lab Results  (Last result in the past 365 days)      Color   Clarity   Blood   Leuk Est   Nitrite   Protein   CREAT   Urine HCG        12/16/21 1607 Yellow   Clear   Negative   Negative   Negative   Negative                 Microbiology Results Abnormal     Procedure Component Value - Date/Time    Respiratory Panel PCR w/COVID-19(SARS-CoV-2) MELISSA/EDVIN/TIANNA/PAD/COR/MAD/DOMINIQUE In-House, NP Swab in UTM/VTM, 3-4 HR TAT - Swab, Nasopharynx [[id number]]  (Normal) Collected: 12/16/21 1555    Lab Status: Final result Specimen: Swab from Nasopharynx Updated: 12/16/21 1728     ADENOVIRUS, PCR Not Detected     Coronavirus 229E Not Detected     Coronavirus HKU1 Not Detected     Coronavirus NL63 Not Detected     Coronavirus OC43 Not Detected     COVID19 Not Detected     Human Metapneumovirus Not Detected     Human Rhinovirus/Enterovirus Not Detected     Influenza A PCR Not Detected     Influenza B PCR Not Detected     Parainfluenza Virus 1 Not Detected     Parainfluenza Virus 2 Not Detected     Parainfluenza Virus 3 Not Detected     Parainfluenza Virus 4 Not Detected     RSV, PCR Not Detected     Bordetella pertussis pcr Not Detected     Bordetella parapertussis PCR Not Detected     Chlamydophila pneumoniae PCR Not Detected     Mycoplasma pneumo by PCR Not Detected    Narrative:      In the setting of a positive respiratory panel with a viral infection PLUS a negative procalcitonin without other underlying concern for  bacterial infection, consider observing off antibiotics or discontinuation of antibiotics and continue supportive care. If the respiratory panel is positive for atypical bacterial infection (Bordetella pertussis, Chlamydophila pneumoniae, or Mycoplasma pneumoniae), consider antibiotic de-escalation to target atypical bacterial infection.          CT Head Without Contrast    Result Date: 12/16/2021  EXAMINATION: CT HEAD WO CONTRAST-12/16/2021:  INDICATION: Dizzy, GI bleed.  TECHNIQUE: CT head without intravenous contrast administration.  The radiation dose reduction device was turned on for each scan per the ALARA (As Low as Reasonably Achievable) protocol.  COMPARISON: NONE.  FINDINGS: The midline structures are symmetric without evidence of mass, mass effect or midline shift. Ventricles in normal limits, however, sulci moderately prominent towards the vertex of at least mild-to-moderate generalized atrophy and/or age-related volume loss. No intra-axial hemorrhage or extra-axial fluid collection. Globes and orbits are unremarkable. The paranasal sinuses and mastoid air cells are grossly clear and well pneumatized. Calvarium intact.      Impression: No acute intracranial findings with mild-to-moderate senescent changes.  D:  12/16/2021 E:  12/16/2021    This report was finalized on 12/16/2021 4:40 PM by Dr. Akash Gramajo.      CT Abdomen Pelvis With Contrast    Result Date: 12/16/2021  EXAMINATION: CT ABDOMEN AND PELVIS W CONTRAST-12/16/2021:  INDICATION: Bloody stool.  TECHNIQUE: CT abdomen and pelvis with intravenous contrast.  The radiation dose reduction device was turned on for each scan per the ALARA (As Low as Reasonably Achievable) protocol.  COMPARISON: NONE.  FINDINGS: The lung bases demonstrate minimal subsegmental atelectasis without consolidation or effusion. The liver demonstrates diffuse low-attenuation of hepatic steatosis with several small subcentimeter areas of low attenuation consistent of hepatic  cysts although too small to characterize without dominant focus separate. The gallbladder contains numerous stones in the dependent portion of cholelithiasis without wall thickening. No biliary dilatation. Pancreas and spleen unremarkable. Adrenals without distinct nodule. Kidneys without hydronephrosis or hydroureter. No bulky retroperitoneal adenopathy. Atherosclerotic, nonaneurysmal, patent abdominal aorta with patent celiac and SMA origins. Portal veins and IVC patent. GI tract evaluation reveals small hiatal hernia. No disproportionate dilatation of bowel to suggest mechanical obstructive process. Pancolonic diverticulosis without evidence for acute diverticulitis. No free fluid or intra-abdominal free air. Pelvic viscera unremarkable without bulky pelvic adenopathy or free fluid. Degenerative changes of the spine and pelvis without aggressive osseous lesion. No soft tissue body wall findings of acuity.      Impression: No focal inflammatory findings of bowel or hyperdense signal within the bowel to suggest blood products. No perforation or abscess.  D:  12/16/2021 E:  12/16/2021  This report was finalized on 12/16/2021 4:40 PM by Dr. Akash Gramajo.      XR Chest 1 View    Result Date: 12/16/2021  EXAMINATION: XR CHEST 1 VW-12/16/2021:  INDICATION: GI bleed.  COMPARISON: NONE.  FINDINGS: Single view of the chest reveals cardiac size borderline enlarged without overt edema. No pneumothorax or pleural effusion. Chronic changes. No consolidation.      Impression: No acute cardiopulmonary process.  D:  12/16/2021 E:  12/16/2021  This report was finalized on 12/16/2021 4:39 PM by Dr. Akash Gramajo.            I have reviewed the medications:  Scheduled Meds:flecainide, 100 mg, Oral, BID  losartan, 25 mg, Oral, Q24H  multivitamin with minerals, 1 tablet, Oral, Daily  pantoprazole, 40 mg, Intravenous, Q12H  sodium chloride, 10 mL, Intravenous, Q12H      Continuous Infusions:   PRN Meds:.•  acetaminophen **OR**  acetaminophen **OR** acetaminophen  •  ondansetron **OR** ondansetron  •  sodium chloride  •  sodium chloride    Assessment/Plan   Assessment & Plan     Active Hospital Problems    Diagnosis  POA   • Gastrointestinal hemorrhage [K92.2]  Yes   • Lower GI bleed [K92.2]  Yes   • A-fib (HCC) [I48.91]  Yes   • HTN (hypertension) [I10]  Yes   • HLD (hyperlipidemia) [E78.5]  Yes      Resolved Hospital Problems   No resolved problems to display.        Brief Hospital Course to date:  Vy Dao is a 81 y.o. female  with medical history significant for A. fib, recently started on Xarelto as anticoagulation, as well as flecainide, hypertension and dyslipidemia.  She developed bright red blood per rectum over the last 2 days and started to become symptomatic with dizziness upon standing.  Hemoglobin on admission 6.7.  She received 2 units of blood on admit. Colonoscopy shows internal hemorrhoid with significant bleeding.   Discussed with Dr. Brunner who has already spoke with her surgeon - Dr Schulte, has outpt follow-up next week on Dec 21. Hold xarelto until followup with colorectal surgery next week      Lower GI bleed   Symptomatic anemia   --Following, plan for colonoscopy in a.m.  --Repeat CBC improved  --Continue IV Protonix  --colonoscopy shows bleeding hemorrhoid, H/H stable  --hold xarelto until follow up with surgery next week     AFIB  -- cont flecainide   -- follows with Cardiologist in [place] but may want to switch over care here  -- hold xarelto at DC     HTN  HLD  --better, continue      FRANDY  - resolved  --back to baseline  --add low k to diet     DVT prophylaxis:  Medical and mechanical DVT prophylaxis orders are present.       AM-PAC 6 Clicks Score (PT): 20 (12/17/21 2000)    Disposition: I expect the patient to be discharged tomorrow when sister is able to come and pick her up.     CODE STATUS:   Code Status and Medical Interventions:   Ordered at: 12/16/21 9960     Level Of Support Discussed With:     Patient     Code Status (Patient has no pulse and is not breathing):    CPR (Attempt to Resuscitate)     Medical Interventions (Patient has pulse or is breathing):    Full Support       Maia Vásquez, DO  12/18/21

## 2021-12-18 NOTE — BRIEF OP NOTE
COLONOSCOPY  Progress Note    Vy Dao  12/18/2021    Colonoscopy reveals prolapsed grade 3 internal hemorrhoids with stigmata of recent bleeding (clot).  Sigmoid diverticulosis is noted.  No polyps seen.  Terminal ileum normal.    >> Anusol suppository twice daily for 1 week.  >> Discussed with Dr. Schulte, who will see the patient in the office on Tuesday, 12/21/2021.  >> Recommend remain off anticoagulation until evaluated by Dr. Schulte.    Stable for discharge from GI standpoint.    Mark I. Brunner, MD     Date: 12/18/2021  Time: 11:39 EST

## 2021-12-19 VITALS
BODY MASS INDEX: 25 KG/M2 | TEMPERATURE: 98.9 F | HEART RATE: 82 BPM | OXYGEN SATURATION: 95 % | HEIGHT: 64 IN | WEIGHT: 146.4 LBS | DIASTOLIC BLOOD PRESSURE: 88 MMHG | SYSTOLIC BLOOD PRESSURE: 148 MMHG | RESPIRATION RATE: 17 BRPM

## 2021-12-19 LAB
ALBUMIN SERPL-MCNC: 3.1 G/DL (ref 3.5–5.2)
ANION GAP SERPL CALCULATED.3IONS-SCNC: 7 MMOL/L (ref 5–15)
BASOPHILS # BLD AUTO: 0.03 10*3/MM3 (ref 0–0.2)
BASOPHILS NFR BLD AUTO: 0.5 % (ref 0–1.5)
BUN SERPL-MCNC: 18 MG/DL (ref 8–23)
BUN/CREAT SERPL: 25 (ref 7–25)
CALCIUM SPEC-SCNC: 8.5 MG/DL (ref 8.6–10.5)
CHLORIDE SERPL-SCNC: 109 MMOL/L (ref 98–107)
CO2 SERPL-SCNC: 25 MMOL/L (ref 22–29)
CREAT SERPL-MCNC: 0.72 MG/DL (ref 0.57–1)
DEPRECATED RDW RBC AUTO: 47.5 FL (ref 37–54)
EOSINOPHIL # BLD AUTO: 0.17 10*3/MM3 (ref 0–0.4)
EOSINOPHIL NFR BLD AUTO: 2.6 % (ref 0.3–6.2)
ERYTHROCYTE [DISTWIDTH] IN BLOOD BY AUTOMATED COUNT: 13.9 % (ref 12.3–15.4)
GFR SERPL CREATININE-BSD FRML MDRD: 78 ML/MIN/1.73
GLUCOSE SERPL-MCNC: 102 MG/DL (ref 65–99)
HCT VFR BLD AUTO: 26.6 % (ref 34–46.6)
HGB BLD-MCNC: 8.4 G/DL (ref 12–15.9)
IMM GRANULOCYTES # BLD AUTO: 0.02 10*3/MM3 (ref 0–0.05)
IMM GRANULOCYTES NFR BLD AUTO: 0.3 % (ref 0–0.5)
LYMPHOCYTES # BLD AUTO: 1.32 10*3/MM3 (ref 0.7–3.1)
LYMPHOCYTES NFR BLD AUTO: 20 % (ref 19.6–45.3)
MCH RBC QN AUTO: 29.8 PG (ref 26.6–33)
MCHC RBC AUTO-ENTMCNC: 31.6 G/DL (ref 31.5–35.7)
MCV RBC AUTO: 94.3 FL (ref 79–97)
MONOCYTES # BLD AUTO: 0.65 10*3/MM3 (ref 0.1–0.9)
MONOCYTES NFR BLD AUTO: 9.8 % (ref 5–12)
NEUTROPHILS NFR BLD AUTO: 4.42 10*3/MM3 (ref 1.7–7)
NEUTROPHILS NFR BLD AUTO: 66.8 % (ref 42.7–76)
NRBC BLD AUTO-RTO: 0 /100 WBC (ref 0–0.2)
PHOSPHATE SERPL-MCNC: 3.1 MG/DL (ref 2.5–4.5)
PLATELET # BLD AUTO: 213 10*3/MM3 (ref 140–450)
PMV BLD AUTO: 9.9 FL (ref 6–12)
POTASSIUM SERPL-SCNC: 4.2 MMOL/L (ref 3.5–5.2)
RBC # BLD AUTO: 2.82 10*6/MM3 (ref 3.77–5.28)
SODIUM SERPL-SCNC: 141 MMOL/L (ref 136–145)
WBC NRBC COR # BLD: 6.61 10*3/MM3 (ref 3.4–10.8)

## 2021-12-19 PROCEDURE — A9270 NON-COVERED ITEM OR SERVICE: HCPCS | Performed by: INTERNAL MEDICINE

## 2021-12-19 PROCEDURE — G0378 HOSPITAL OBSERVATION PER HR: HCPCS

## 2021-12-19 PROCEDURE — 63710000001 LOSARTAN 25 MG TABLET: Performed by: INTERNAL MEDICINE

## 2021-12-19 PROCEDURE — 63710000001 FLECAINIDE 50 MG TABLET: Performed by: INTERNAL MEDICINE

## 2021-12-19 PROCEDURE — 80069 RENAL FUNCTION PANEL: CPT | Performed by: INTERNAL MEDICINE

## 2021-12-19 PROCEDURE — 63710000001 HYDROCORTISONE 25 MG SUPPOSITORY: Performed by: INTERNAL MEDICINE

## 2021-12-19 PROCEDURE — 85025 COMPLETE CBC W/AUTO DIFF WBC: CPT | Performed by: INTERNAL MEDICINE

## 2021-12-19 PROCEDURE — 99217 PR OBSERVATION CARE DISCHARGE MANAGEMENT: CPT | Performed by: INTERNAL MEDICINE

## 2021-12-19 PROCEDURE — 63710000001 MULTIVITAMIN WITH MINERALS TABLET: Performed by: INTERNAL MEDICINE

## 2021-12-19 RX ORDER — HYDROCORTISONE ACETATE 25 MG/1
25 SUPPOSITORY RECTAL 2 TIMES DAILY
Qty: 12 SUPPOSITORY | Refills: 0 | Status: SHIPPED | OUTPATIENT
Start: 2021-12-19 | End: 2021-12-25

## 2021-12-19 RX ORDER — PANTOPRAZOLE SODIUM 40 MG/1
40 TABLET, DELAYED RELEASE ORAL DAILY
Qty: 30 TABLET | Refills: 0 | Status: SHIPPED | OUTPATIENT
Start: 2021-12-19

## 2021-12-19 RX ORDER — HYDROCORTISONE ACETATE 25 MG/1
25 SUPPOSITORY RECTAL 2 TIMES DAILY
Qty: 12 SUPPOSITORY | Refills: 0 | Status: SHIPPED | OUTPATIENT
Start: 2021-12-19 | End: 2021-12-19

## 2021-12-19 RX ADMIN — FLECAINIDE ACETATE 100 MG: 50 TABLET ORAL at 08:09

## 2021-12-19 RX ADMIN — HYDROCORTISONE ACETATE 25 MG: 25 SUPPOSITORY RECTAL at 08:09

## 2021-12-19 RX ADMIN — MULTIPLE VITAMINS W/ MINERALS TAB 1 TABLET: TAB at 08:09

## 2021-12-19 RX ADMIN — SODIUM CHLORIDE, PRESERVATIVE FREE 10 ML: 5 INJECTION INTRAVENOUS at 08:09

## 2021-12-19 RX ADMIN — PANTOPRAZOLE SODIUM 40 MG: 40 INJECTION, POWDER, FOR SOLUTION INTRAVENOUS at 08:09

## 2021-12-19 RX ADMIN — LOSARTAN POTASSIUM 25 MG: 25 TABLET, FILM COATED ORAL at 08:09

## 2021-12-19 NOTE — CASE MANAGEMENT/SOCIAL WORK
Case Management Discharge Note      Final Note: I have spoken to Ms. Dao by phone regarding today's discharge.  She states that she will go home with her sister.  I have placed an order for a bedside commode and faxed the referral as requested to [facility] at [phone].  Per Danika at [facility] they have received the referral and agree to deliver the commode chair to her sister's address in [place].  Address given is [address] [place].  Ms. Dao denies having any other discharge needs at this time.         Selected Continued Care - Admitted Since 12/16/2021     Destination    No services have been selected for the patient.              Durable Medical Equipment Coordination complete.    Service Provider Selected Services Address Phone Fax Patient Preferred    [facility] [place]  Durable Medical Equipment [address] [id number] [phone] --          Dialysis/Infusion    No services have been selected for the patient.              Home Medical Care    No services have been selected for the patient.              Therapy    No services have been selected for the patient.              Community Resources    No services have been selected for the patient.              Community & DME    No services have been selected for the patient.                       Final Discharge Disposition Code: 01 - home or self-care

## 2021-12-19 NOTE — DISCHARGE SUMMARY
[facility] Medicine Services  DISCHARGE SUMMARY    Patient Name: Vy Dao  : 1940  MRN: [id number]    Date of Admission: 2021  1:10 PM  Date of Discharge: 2021  Primary Care Physician: Shima Jennings MD    Consults     Date and Time Order Name Status Description    2021 12:36 AM Inpatient Gastroenterology Consult Completed           Hospital Course     Presenting Problem:   Gastrointestinal hemorrhage, unspecified gastrointestinal hemorrhage type [K92.2]    Active Hospital Problems    Diagnosis  POA   • Gastrointestinal hemorrhage [K92.2]  Yes   • Lower GI bleed [K92.2]  Yes   • A-fib (HCC) [I48.91]  Yes   • HTN (hypertension) [I10]  Yes   • HLD (hyperlipidemia) [E78.5]  Yes      Resolved Hospital Problems   No resolved problems to display.          Hospital Course:  Vy Dao is a 81 y.o. female with medical history significant for A. fib, recently started on Xarelto as anticoagulation, as well as flecainide, hypertension and dyslipidemia.  She developed bright red blood per rectum over the last 2 days prior to admit and was symptomatic, orthostatic on admission.  Hemoglobin on admission 6.7.  She received 2 units of blood on admit. Colonoscopy showed 3 internal hemorrhoids with clots.  Discussed with Dr. Brunner who has already spoke with her surgeon - Dr Schulte, has outpt follow-up next week on Dec 21.  She was instructed to hold xarelto until followup with colorectal surgery next week        Lower GI bleed   Symptomatic anemia   --Continue Anusol suppository for 1 week  --Follow-up with Dr. Schulte on Tuesday  --No Xarelto until follow-up with colorectal surgery  --Needs repeat CBC with PCP in 1 week      AFIB  -- cont flecainide   --Rate overall controlled, follow-up with cardiologist in [place] as instructed by them     HTN  HLD  --Resume prazosin at DC     FRANDY  - resolved  --Renal function stable, follow-up with PCP for repeat labs in about a  week      Discharge Follow Up Recommendations for outpatient labs/diagnostics:  PCP in 1 week  Dr. Schulte, Tuesday 12/21    Day of Discharge     HPI:   Doing okay, discussed again with her not to take Xarelto until she sees colorectal surgery.  Also gave her written instructions for discharge    Review of Systems  Gen- No fevers, chills  CV- No chest pain, palpitations  Resp- No cough, dyspnea  GI- No N/V/D, abd pain      Vital Signs:   Temp:  [97.7 °F (36.5 °C)-98.9 °F (37.2 °C)] 98.9 °F (37.2 °C)  Heart Rate:  [75-89] 82  Resp:  [15-18] 17  BP: ()/(57-99) 148/88     Physical Exam:  Constitutional: No acute distress, awake, alert  HENT: NCAT, mucous membranes moist  Respiratory: Clear to auscultation bilaterally, respiratory effort normal   Cardiovascular: IRRR, soft murmur  Gastrointestinal: Positive bowel sounds, soft, nontender, nondistended  Musculoskeletal: No bilateral ankle edema  Psychiatric: Appropriate affect, cooperative  Neurologic: Oriented x 2-3, speech clear  Skin: No rashes    Pertinent  and/or Most Recent Results     LAB RESULTS:      Lab 12/19/21  0520 12/18/21  0828 12/17/21  2059 12/17/21  0420 12/16/21  2100 12/16/21  1243 12/16/21  1243   WBC 6.61 7.02  --  6.57  --   --  11.72*   HEMOGLOBIN 8.4* 9.8* 9.6* 9.5* 6.7*   < > 7.8*   HEMATOCRIT 26.6* 30.4*  --  28.8*  --   --  22.9*   PLATELETS 213 234  --  200  --   --  279   NEUTROS ABS 4.42 5.01  --  4.20  --   --  9.83*   IMMATURE GRANS (ABS) 0.02 0.02  --   --   --   --  0.06*   LYMPHS ABS 1.32 1.12  --   --   --   --  1.11   MONOS ABS 0.65 0.68  --   --   --   --  0.69   EOS ABS 0.17 0.15  --  0.07  --   --  0.00   MCV 94.3 94.4  --  92.9  --   --  93.5    < > = values in this interval not displayed.         Lab 12/19/21  0520 12/18/21  0828 12/17/21  0420 12/16/21  1243   SODIUM 141 144 141 131*   POTASSIUM 4.2 5.2 4.8 4.4   CHLORIDE 109* 112* 108* 97*   CO2 25.0 25.0 25.0 24.0   ANION GAP 7.0 7.0 8.0 10.0   BUN 18 17 25* 39*    CREATININE 0.72 0.93 0.83 1.22*   GLUCOSE 102* 105* 95 154*   CALCIUM 8.5* 8.9 8.5* 8.8   PHOSPHORUS 3.1 3.0  --   --          Lab 12/19/21  0520 12/18/21  0828 12/16/21  1243   TOTAL PROTEIN  --   --  6.2   ALBUMIN 3.10* 3.50 3.90   GLOBULIN  --   --  2.3   ALT (SGPT)  --   --  15   AST (SGOT)  --   --  18   BILIRUBIN  --   --  0.3   ALK PHOS  --   --  64         Lab 12/16/21  1243   PROBNP 2,576.0*   TROPONIN T <0.010             Lab 12/16/21  1723 12/16/21  1245 12/16/21  1245   ABO TYPING O   < > O   RH TYPING Positive   < > Positive   ANTIBODY SCREEN  --   --  Negative    < > = values in this interval not displayed.         Brief Urine Lab Results  (Last result in the past 365 days)      Color   Clarity   Blood   Leuk Est   Nitrite   Protein   CREAT   Urine HCG        12/16/21 1607 Yellow   Clear   Negative   Negative   Negative   Negative               Microbiology Results (last 10 days)     Procedure Component Value - Date/Time    Respiratory Panel PCR w/COVID-19(SARS-CoV-2) MELISSA/EDVIN/TIANNA/PAD/COR/MAD/DOMINIQUE In-House, NP Swab in UTM/VTM, 3-4 HR TAT - Swab, Nasopharynx [[id number]]  (Normal) Collected: 12/16/21 1555    Lab Status: Final result Specimen: Swab from Nasopharynx Updated: 12/16/21 1728     ADENOVIRUS, PCR Not Detected     Coronavirus 229E Not Detected     Coronavirus HKU1 Not Detected     Coronavirus NL63 Not Detected     Coronavirus OC43 Not Detected     COVID19 Not Detected     Human Metapneumovirus Not Detected     Human Rhinovirus/Enterovirus Not Detected     Influenza A PCR Not Detected     Influenza B PCR Not Detected     Parainfluenza Virus 1 Not Detected     Parainfluenza Virus 2 Not Detected     Parainfluenza Virus 3 Not Detected     Parainfluenza Virus 4 Not Detected     RSV, PCR Not Detected     Bordetella pertussis pcr Not Detected     Bordetella parapertussis PCR Not Detected     Chlamydophila pneumoniae PCR Not Detected     Mycoplasma pneumo by PCR Not Detected    Narrative:      In the  setting of a positive respiratory panel with a viral infection PLUS a negative procalcitonin without other underlying concern for bacterial infection, consider observing off antibiotics or discontinuation of antibiotics and continue supportive care. If the respiratory panel is positive for atypical bacterial infection (Bordetella pertussis, Chlamydophila pneumoniae, or Mycoplasma pneumoniae), consider antibiotic de-escalation to target atypical bacterial infection.          CT Head Without Contrast    Result Date: 12/16/2021  EXAMINATION: CT HEAD WO CONTRAST-12/16/2021:  INDICATION: Dizzy, GI bleed.  TECHNIQUE: CT head without intravenous contrast administration.  The radiation dose reduction device was turned on for each scan per the ALARA (As Low as Reasonably Achievable) protocol.  COMPARISON: NONE.  FINDINGS: The midline structures are symmetric without evidence of mass, mass effect or midline shift. Ventricles in normal limits, however, sulci moderately prominent towards the vertex of at least mild-to-moderate generalized atrophy and/or age-related volume loss. No intra-axial hemorrhage or extra-axial fluid collection. Globes and orbits are unremarkable. The paranasal sinuses and mastoid air cells are grossly clear and well pneumatized. Calvarium intact.      No acute intracranial findings with mild-to-moderate senescent changes.  D:  12/16/2021 E:  12/16/2021    This report was finalized on 12/16/2021 4:40 PM by Dr. Akash Gramajo.      CT Abdomen Pelvis With Contrast    Result Date: 12/16/2021  EXAMINATION: CT ABDOMEN AND PELVIS W CONTRAST-12/16/2021:  INDICATION: Bloody stool.  TECHNIQUE: CT abdomen and pelvis with intravenous contrast.  The radiation dose reduction device was turned on for each scan per the ALARA (As Low as Reasonably Achievable) protocol.  COMPARISON: NONE.  FINDINGS: The lung bases demonstrate minimal subsegmental atelectasis without consolidation or effusion. The liver demonstrates diffuse  low-attenuation of hepatic steatosis with several small subcentimeter areas of low attenuation consistent of hepatic cysts although too small to characterize without dominant focus separate. The gallbladder contains numerous stones in the dependent portion of cholelithiasis without wall thickening. No biliary dilatation. Pancreas and spleen unremarkable. Adrenals without distinct nodule. Kidneys without hydronephrosis or hydroureter. No bulky retroperitoneal adenopathy. Atherosclerotic, nonaneurysmal, patent abdominal aorta with patent celiac and SMA origins. Portal veins and IVC patent. GI tract evaluation reveals small hiatal hernia. No disproportionate dilatation of bowel to suggest mechanical obstructive process. Pancolonic diverticulosis without evidence for acute diverticulitis. No free fluid or intra-abdominal free air. Pelvic viscera unremarkable without bulky pelvic adenopathy or free fluid. Degenerative changes of the spine and pelvis without aggressive osseous lesion. No soft tissue body wall findings of acuity.      No focal inflammatory findings of bowel or hyperdense signal within the bowel to suggest blood products. No perforation or abscess.  D:  12/16/2021 E:  12/16/2021  This report was finalized on 12/16/2021 4:40 PM by Dr. Akash Gramajo.      XR Chest 1 View    Result Date: 12/16/2021  EXAMINATION: XR CHEST 1 VW-12/16/2021:  INDICATION: GI bleed.  COMPARISON: NONE.  FINDINGS: Single view of the chest reveals cardiac size borderline enlarged without overt edema. No pneumothorax or pleural effusion. Chronic changes. No consolidation.      No acute cardiopulmonary process.  D:  12/16/2021 E:  12/16/2021  This report was finalized on 12/16/2021 4:39 PM by Dr. Akash Gramajo.                    Plan for Follow-up of Pending Labs/Results:    Discharge Details        Discharge Medications      New Medications      Instructions Start Date   hydrocortisone 25 MG suppository  Commonly known as: ANUSOL-HC   25  mg, Rectal, 2 Times Daily      pantoprazole 40 MG EC tablet  Commonly known as: Protonix   40 mg, Oral, Daily         Continue These Medications      Instructions Start Date   flecainide 100 MG tablet  Commonly known as: TAMBOCOR   100 mg, Oral, 2 Times Daily      multivitamin with minerals tablet tablet   1 tablet, Oral, Daily      prazosin 1 MG capsule  Commonly known as: MINIPRESS   1 mg, Oral, Nightly      Vitamin C 100 MG chewable tablet   Oral         Stop These Medications    atenolol 50 MG tablet  Commonly known as: TENORMIN     CO Q 10 PO     Glucosamine Complex tablet     hydroCHLOROthiazide 25 MG tablet  Commonly known as: HYDRODIURIL     Matzim  MG 24 hr tablet  Generic drug: dilTIAZem LA     OLMESARTAN MEDOXOMIL PO     OMEGA-3 PO     rivaroxaban 20 MG tablet  Commonly known as: XARELTO     VITAMIN D (CHOLECALCIFEROL) PO            Allergies   Allergen Reactions   • Amoxicillin Rash         Discharge Disposition:  Home or Self Care    Diet:  Hospital:  Diet Order   Procedures   • Diet Regular; Cardiac, Low Potassium; 2 gm (50 mEq)       Activity:      Restrictions or Other Recommendations:  Hold Xarelto until further instructed to restart       CODE STATUS:    Code Status and Medical Interventions:   Ordered at: 12/16/21 1740     Level Of Support Discussed With:    Patient     Code Status (Patient has no pulse and is not breathing):    CPR (Attempt to Resuscitate)     Medical Interventions (Patient has pulse or is breathing):    Full Support       No future appointments.    Additional Instructions for the Follow-ups that You Need to Schedule     Discharge Follow-up with PCP   As directed       Currently Documented PCP:    Shima Jennings MD    PCP Phone Number:    [phone]     Follow Up Details: 1 week         Discharge Follow-up with Specified Provider: Dr Schulte   As directed      To: Dr Schulte    Follow Up Details: On Tuesday - office is supposed to call on Monday 12/20 with appt time  [phone]  [phone]                     Maia Vásquez DO  12/19/21      Time Spent on Discharge:  I spent 42 minutes on this discharge activity which included: face-to-face encounter with the patient, reviewing the data in the system, coordination of the care with the nursing staff as well as consultants, documentation, and entering orders.

## 2021-12-19 NOTE — DISCHARGE PLACEMENT REQUEST
"Case Management  [phone]    Vy Dao (81 y.o. Female)             Date of Birth Social Security Number Address Home Phone MRN    1940  [place] [address] [phone] [id number]    Buddhist Marital Status             Presbyterian        Admission Date Admission Type Admitting Provider Attending Provider Department, Room/Bed    21 Emergency Maia Vásquez DO Carroll, Meghan C,  [facility] 5G, S566/1    Discharge Date Discharge Disposition Discharge Destination           Home or Self Care              Attending Provider: Maia Vásquez DO    Allergies: Amoxicillin    Isolation: None   Infection: None   Code Status: CPR   Advance Care Planning Activity    Ht: 162.6 cm (64\")   Wt: 66.4 kg (146 lb 6.4 oz)    Admission Cmt: None   Principal Problem: None                Active Insurance as of 2021     Primary Coverage     Payor Plan Insurance Group Employer/Plan Group    HUMANA MEDICARE REPLACEMENT HUMANA MEDICARE REPLACEMENT [id number]     Payor Plan Address Payor Plan Phone Number Payor Plan Fax Number Effective Dates    [address] [address] [phone]  2018 - None Entered    [facility] [id number]       Subscriber Name Subscriber Birth Date Member ID       VY DAO 1940 [id number]                 Emergency Contacts      (Rel.) Home Phone Work Phone Mobile Phone    Mavis Vargas (Sister) [phone] -- --          [address] [address]  Dept. Phone:  [phone]  Dept. Fax:  [phone] Date Ordered: Dec 19, 2021         Patient:  Vy Dao MRN:  [id number]   [address] [place] [address] :  1940  SSN:    Phone: [phone] Sex:  F     Weight: 66.4 kg (146 lb 6.4 oz)         Ht Readings from Last 1 Encounters:   21 162.6 cm (64\")         Commode Chair          (Order ID: [id number])    Diagnosis:  Gastrointestinal " hemorrhage, unspecified gastrointestinal hemorrhage type (K92.2 [ICD-10-CM] 578.9 [ICD-9-CM])  Atrial fibrillation, unspecified type (HCC) (I48.91 [ICD-10-CM] 427.31 [ICD-9-CM])  Anemia, unspecified type (D64.9 [ICD-10-CM] 285.9 [ICD-9-CM])  Dizziness (R42 [ICD-10-CM] 780.4 [ICD-9-CM])  Hypertension, unspecified type (I10 [ICD-10-CM] 401.9 [ICD-9-CM])   Quantity:  1     Equipment:  Bedside Commode Chair w/Fixed Arms  Length of Need (99 Months = Lifetime): 15 Months        Authorizing Provider's Phone: [phone]  Verbal Order Mode: Verbal with readback   Authorizing Provider: Maia Vásquez DO  Authorizing Provider's NPI: [id number]     Order Entered By: Ashley Klein RN 2021 12:51 PM     Electronically signed by:              History & Physical      Abraham Prado MD at 21 [address] [place] [address] Medicine Services  HISTORY AND PHYSICAL    Patient Name: Vy Dao  : 1940  MRN: [id number]  Primary Care Physician: Shima Jennings MD  Date of admission: 2021    Subjective   Subjective     Chief Complaint:  Bloody stool    HPI:  Vy Dao is a 81 y.o. female with PMH  Of Afib with anticoagulation, HTN, HLD. Patient was diagnosed with Afib 3 weeks ago and was started on xarelto. She started noticing some bright red blood in her stool about 2 weeks ago. She has a hx of hemorrhoids and took a supp which helped but after a few days the blood returned. Over the last two days she has been getting weak, dizzy and feeling like she was going to pass out every time she stood up. She did have some chest pressure at times. She also noticed her blood pressure was lower than normal. She has not taken her xarelto today. She denies any soa, fever, chills, or N/v/D.     In ED: bnp 2576, WBc 11.72, HGb 7.8, creat 1.22, , trop <0.010, + occult stool      COVID Details:        Symptoms: [x] NONE [] Fever []  Cough [] Shortness of breath [] Change in  taste or smell  The patient qualifies to receive the vaccine, but they have not yet received it.    Review of Systems   Constitutional: Positive for appetite change and fatigue. Negative for chills and fever.   Respiratory: Positive for shortness of breath.    Cardiovascular: Negative for chest pain.   Gastrointestinal: Positive for blood in stool. Negative for abdominal distention, abdominal pain, diarrhea, nausea and vomiting.   Genitourinary: Negative for dysuria.   Neurological: Positive for dizziness, weakness and light-headedness.        All other systems reviewed and are negative.     Personal History     Past Medical History:   Diagnosis Date   • Atrial fibrillation (HCC)    • Hypertension    • Urinary tract infection        History reviewed. No pertinent surgical history.    Family History:  family history includes Atrial fibrillation in her sister; Cancer in her sister; Heart disease in her father. Otherwise pertinent FHx was reviewed and unremarkable.     Social History:  reports that she quit smoking about 30 years ago. She has never used smokeless tobacco. She reports that she does not drink alcohol and does not use drugs.  Social History     Social History Narrative   • Not on file       Medications:  Cholecalciferol, Coenzyme Q10, Glucosamine Complex, Olmesartan Medoxomil, Omega-3 Fatty Acids, Vitamin C, atenolol, dilTIAZem LA, flecainide, hydroCHLOROthiazide, multivitamin with minerals, prazosin, and rivaroxaban    Allergies   Allergen Reactions   • Amoxicillin Rash       Objective   Objective     Vital Signs:   Temp:  [99.8 °F (37.7 °C)] 99.8 °F (37.7 °C)  Heart Rate:  [67-75] 72  Resp:  [20] 20  BP: (116-140)/(62-75) 136/73    Physical Exam   Constitutional: Awake, alert  Eyes: PERRLA, sclerae anicteric, no conjunctival injection  HENT: NCAT, mucous membranes moist  Neck: Supple, no thyromegaly, no lymphadenopathy, trachea midline  Respiratory: Clear to auscultation bilaterally, nonlabored  respirations room air 97%  Cardiovascular: irregular, no murmurs, rubs, or gallops, palpable pedal pulses bilaterally  Gastrointestinal: Positive bowel sounds, soft, nontender, nondistended  Musculoskeletal: No bilateral ankle edema, no clubbing or cyanosis to extremities  Psychiatric: Appropriate affect, cooperative  Neurologic: Oriented x 3, strength symmetric in all extremities, Cranial Nerves grossly intact to confrontation, speech clear  Skin: No rashes, pale       Result Review:  I have personally reviewed the results from the time of this admission to 12/16/21 3:59 PM EST and agree with these findings:  [x]  Laboratory  []  Microbiology  [x]  Radiology  []  EKG/Telemetry   []  Cardiology/Vascular   []  Pathology  []  Old records  []  Other:  Most notable findings include:       LAB RESULTS:      Lab 12/16/21  1243   WBC 11.72*   HEMOGLOBIN 7.8*   HEMATOCRIT 22.9*   PLATELETS 279   NEUTROS ABS 9.83*   IMMATURE GRANS (ABS) 0.06*   LYMPHS ABS 1.11   MONOS ABS 0.69   EOS ABS 0.00   MCV 93.5         Lab 12/16/21  1243   SODIUM 131*   POTASSIUM 4.4   CHLORIDE 97*   CO2 24.0   ANION GAP 10.0   BUN 39*   CREATININE 1.22*   GLUCOSE 154*   CALCIUM 8.8         Lab 12/16/21  1243   TOTAL PROTEIN 6.2   ALBUMIN 3.90   GLOBULIN 2.3   ALT (SGPT) 15   AST (SGOT) 18   BILIRUBIN 0.3   ALK PHOS 64         Lab 12/16/21  1243   PROBNP 2,576.0*   TROPONIN T <0.010             Lab 12/16/21  1245   ABO TYPING O   RH TYPING Positive   ANTIBODY SCREEN Negative         UA    Urinalysis 12/16/21   Specific [place], UA 1.019   Ketones, UA Negative   Blood, UA Negative   Leukocytes, UA Negative   Nitrite, UA Negative           Microbiology Results (last 10 days)     Procedure Component Value - Date/Time    Respiratory Panel PCR w/COVID-19(SARS-CoV-2) MELISSA/EDVIN/TIANNA/PAD/COR/MAD/DOMINIQUE In-House, NP Swab in UTM/VTM, 3-4 HR TAT - Swab, Nasopharynx [[id number]]  (Normal) Collected: 12/16/21 1555    Lab Status: Final result Specimen: Swab from  Nasopharynx Updated: 12/16/21 1728     ADENOVIRUS, PCR Not Detected     Coronavirus 229E Not Detected     Coronavirus HKU1 Not Detected     Coronavirus NL63 Not Detected     Coronavirus OC43 Not Detected     COVID19 Not Detected     Human Metapneumovirus Not Detected     Human Rhinovirus/Enterovirus Not Detected     Influenza A PCR Not Detected     Influenza B PCR Not Detected     Parainfluenza Virus 1 Not Detected     Parainfluenza Virus 2 Not Detected     Parainfluenza Virus 3 Not Detected     Parainfluenza Virus 4 Not Detected     RSV, PCR Not Detected     Bordetella pertussis pcr Not Detected     Bordetella parapertussis PCR Not Detected     Chlamydophila pneumoniae PCR Not Detected     Mycoplasma pneumo by PCR Not Detected    Narrative:      In the setting of a positive respiratory panel with a viral infection PLUS a negative procalcitonin without other underlying concern for bacterial infection, consider observing off antibiotics or discontinuation of antibiotics and continue supportive care. If the respiratory panel is positive for atypical bacterial infection (Bordetella pertussis, Chlamydophila pneumoniae, or Mycoplasma pneumoniae), consider antibiotic de-escalation to target atypical bacterial infection.          CT Head Without Contrast    Result Date: 12/16/2021  EXAMINATION: CT HEAD WO CONTRAST-12/16/2021:  INDICATION: Dizzy, GI bleed.  TECHNIQUE: CT head without intravenous contrast administration.  The radiation dose reduction device was turned on for each scan per the ALARA (As Low as Reasonably Achievable) protocol.  COMPARISON: NONE.  FINDINGS: The midline structures are symmetric without evidence of mass, mass effect or midline shift. Ventricles in normal limits, however, sulci moderately prominent towards the vertex of at least mild-to-moderate generalized atrophy and/or age-related volume loss. No intra-axial hemorrhage or extra-axial fluid collection. Globes and orbits are unremarkable. The  paranasal sinuses and mastoid air cells are grossly clear and well pneumatized. Calvarium intact.      Impression: No acute intracranial findings with mild-to-moderate senescent changes.  D:  12/16/2021 E:  12/16/2021    This report was finalized on 12/16/2021 4:40 PM by Dr. Akash Gramajo.      CT Abdomen Pelvis With Contrast    Result Date: 12/16/2021  EXAMINATION: CT ABDOMEN AND PELVIS W CONTRAST-12/16/2021:  INDICATION: Bloody stool.  TECHNIQUE: CT abdomen and pelvis with intravenous contrast.  The radiation dose reduction device was turned on for each scan per the ALARA (As Low as Reasonably Achievable) protocol.  COMPARISON: NONE.  FINDINGS: The lung bases demonstrate minimal subsegmental atelectasis without consolidation or effusion. The liver demonstrates diffuse low-attenuation of hepatic steatosis with several small subcentimeter areas of low attenuation consistent of hepatic cysts although too small to characterize without dominant focus separate. The gallbladder contains numerous stones in the dependent portion of cholelithiasis without wall thickening. No biliary dilatation. Pancreas and spleen unremarkable. Adrenals without distinct nodule. Kidneys without hydronephrosis or hydroureter. No bulky retroperitoneal adenopathy. Atherosclerotic, nonaneurysmal, patent abdominal aorta with patent celiac and SMA origins. Portal veins and IVC patent. GI tract evaluation reveals small hiatal hernia. No disproportionate dilatation of bowel to suggest mechanical obstructive process. Pancolonic diverticulosis without evidence for acute diverticulitis. No free fluid or intra-abdominal free air. Pelvic viscera unremarkable without bulky pelvic adenopathy or free fluid. Degenerative changes of the spine and pelvis without aggressive osseous lesion. No soft tissue body wall findings of acuity.      Impression: No focal inflammatory findings of bowel or hyperdense signal within the bowel to suggest blood products. No  perforation or abscess.  D:  12/16/2021 E:  12/16/2021  This report was finalized on 12/16/2021 4:40 PM by Dr. Akash Gramajo.      XR Chest 1 View    Result Date: 12/16/2021  EXAMINATION: XR CHEST 1 VW-12/16/2021:  INDICATION: GI bleed.  COMPARISON: NONE.  FINDINGS: Single view of the chest reveals cardiac size borderline enlarged without overt edema. No pneumothorax or pleural effusion. Chronic changes. No consolidation.      Impression: No acute cardiopulmonary process.  D:  12/16/2021 E:  12/16/2021  This report was finalized on 12/16/2021 4:39 PM by Dr. Akash Gramajo.            Assessment/Plan   Assessment & Plan       Lower GI bleed    A-fib (HCC)    HTN (hypertension)    HLD (hyperlipidemia)    Lower GI bleed   Symptomatic anemia   -- consult GI  -- transfuse 2 units PRBC  -- check H/H q 6  -- IV protonix q 12  -- clear liquids   -- + occult stool     AFIB  -- hold xarelto  -- cont flecainide   -- follows with Cardiologist in [place] but may want to switch over care here  -- defer to am provider on consulting cardiology if needed    HTN  HLD  -- hold home antihypertensive meds     Renal insufficiency  - unknown baseline  -- likely related to volume depletion   -- labs in am   -- give blood     Chest pressure  -- trop neg but will trend  -- likely due to mismatch of supply and demand     DVT prophylaxis:  [facility]     CODE STATUS:  Full code   Level Of Support Discussed With: Patient  Code Status (Patient has no pulse and is not breathing): CPR (Attempt to Resuscitate)  Medical Interventions (Patient has pulse or is breathing): Full Support      This note has been completed as part of a split-shared workflow.   Signature:. Electronically signed by ELISE Mock, 12/16/21, 3:59 PM EST.  Patient seen and examined at the bedside.  Patient is a very pleasant 81-year-old  female with past medical history significant for hypertension, hyperlipidemia, atrial fibrillation and patient was started on  Xarelto recently.  Patient also has history of hemorrhoids.  Tells me that she had colonoscopy done about 2 years ago and no polyps were found.  Patient tells me that started seeing some blood about 2 weeks ago.  Used some suppositories and it subsided for 3 to 4 days but then restarted again.  Patient sees fresh red blood in the toilet.  Patient also complains of dizziness particularly when she goes from sitting to standing positions.  Denies any fever or chills or cough or coryza for past few days.  Physical exam:    Constitutional: No acute distress, looks comfortable  HENT: NCAT, mucous membranes moist  Respiratory: Clear to auscultation bilaterally, respiratory effort normal   Cardiovascular: Irregularly irregular, no murmurs, rubs, or gallops  Gastrointestinal: Positive bowel sounds, soft, nontender, nondistended  Musculoskeletal:  bilateral ankle edema  Psychiatric: Appropriate affect, cooperative  Neurologic: Awake, alert, oriented x3, no focality appreciated, speech clear  Skin: No rashes    Assessment and plan:  Patient has anemia secondary to GI blood loss.  She is symptomatic with orthostasis and dizziness.  We will start transfusion.  We will also ask GI to see the patient tomorrow.  Will monitor hemoglobin and hematocrit also.  Please see the above for more details.  Patient will be admitted for inpatient.                  Electronically signed by Abraham Prado MD at 12/16/21 6962

## 2021-12-19 NOTE — PLAN OF CARE
Problem: Fall Injury Risk  Goal: Absence of Fall and Fall-Related Injury  Outcome: Ongoing, Progressing  Intervention: Identify and Manage Contributors to Fall Injury Risk  Recent Flowsheet Documentation  Taken 12/18/2021 2000 by Daylin Batista RN  Medication Review/Management:   medications reviewed   high-risk medications identified  Intervention: Promote Injury-Free Environment  Recent Flowsheet Documentation  Taken 12/19/2021 0200 by Daylin Batista RN  Safety Promotion/Fall Prevention:   activity supervised   assistive device/personal items within reach   clutter free environment maintained   room organization consistent   safety round/check completed  Taken 12/19/2021 0000 by Daylin Batista RN  Safety Promotion/Fall Prevention:   activity supervised   assistive device/personal items within reach   clutter free environment maintained   room organization consistent   safety round/check completed  Taken 12/18/2021 2200 by Daylin Batista RN  Safety Promotion/Fall Prevention:   activity supervised   assistive device/personal items within reach   clutter free environment maintained   room organization consistent   safety round/check completed  Taken 12/18/2021 2000 by Daylin Batista RN  Safety Promotion/Fall Prevention:   activity supervised   assistive device/personal items within reach   clutter free environment maintained   room organization consistent   safety round/check completed     Problem: Adult Inpatient Plan of Care  Goal: Plan of Care Review  Outcome: Ongoing, Progressing  Flowsheets (Taken 12/19/2021 0237)  Progress: improving  Plan of Care Reviewed With: patient  Goal: Patient-Specific Goal (Individualized)  Outcome: Ongoing, Progressing  Goal: Absence of Hospital-Acquired Illness or Injury  Outcome: Ongoing, Progressing  Intervention: Identify and Manage Fall Risk  Recent Flowsheet Documentation  Taken 12/19/2021 0200 by Daylin Batista RN  Safety Promotion/Fall  Prevention:   activity supervised   assistive device/personal items within reach   clutter free environment maintained   room organization consistent   safety round/check completed  Taken 12/19/2021 0000 by Daylin Batista RN  Safety Promotion/Fall Prevention:   activity supervised   assistive device/personal items within reach   clutter free environment maintained   room organization consistent   safety round/check completed  Taken 12/18/2021 2200 by Daylin Batista RN  Safety Promotion/Fall Prevention:   activity supervised   assistive device/personal items within reach   clutter free environment maintained   room organization consistent   safety round/check completed  Taken 12/18/2021 2000 by Daylin Batista RN  Safety Promotion/Fall Prevention:   activity supervised   assistive device/personal items within reach   clutter free environment maintained   room organization consistent   safety round/check completed  Intervention: Prevent Skin Injury  Recent Flowsheet Documentation  Taken 12/19/2021 0200 by Daylin Batista RN  Body Position: position changed independently  Skin Protection:   adhesive use limited   tubing/devices free from skin contact   transparent dressing maintained   skin-to-skin areas padded   skin-to-device areas padded  Taken 12/19/2021 0000 by Daylin Batista RN  Body Position: position changed independently  Skin Protection:   adhesive use limited   tubing/devices free from skin contact   transparent dressing maintained   skin-to-skin areas padded   skin-to-device areas padded  Taken 12/18/2021 2200 by Daylin Batista RN  Body Position: position changed independently  Skin Protection:   adhesive use limited   tubing/devices free from skin contact   transparent dressing maintained   skin-to-skin areas padded   skin-to-device areas padded  Taken 12/18/2021 2000 by Daylin Batista RN  Body Position: position changed independently  Skin Protection:   adhesive use  limited   tubing/devices free from skin contact   transparent dressing maintained   skin-to-skin areas padded   skin-to-device areas padded  Intervention: Prevent and Manage VTE (venous thromboembolism) Risk  Recent Flowsheet Documentation  Taken 12/18/2021 2000 by Daylin Batista RN  VTE Prevention/Management:   bilateral   dorsiflexion/plantar flexion performed  Intervention: Prevent Infection  Recent Flowsheet Documentation  Taken 12/19/2021 0200 by Daylin Batista RN  Infection Prevention:   environmental surveillance performed   rest/sleep promoted  Taken 12/19/2021 0000 by Daylin Batista RN  Infection Prevention:   environmental surveillance performed   rest/sleep promoted  Taken 12/18/2021 2200 by Daylin Batista RN  Infection Prevention:   rest/sleep promoted   environmental surveillance performed  Taken 12/18/2021 2000 by Daylin Batista RN  Infection Prevention:   environmental surveillance performed   equipment surfaces disinfected   personal protective equipment utilized   single patient room provided   visitors restricted/screened   rest/sleep promoted   hand hygiene promoted  Goal: Optimal Comfort and Wellbeing  Outcome: Ongoing, Progressing  Intervention: Provide Person-Centered Care  Recent Flowsheet Documentation  Taken 12/18/2021 2000 by Daylin Batista RN  Trust Relationship/Rapport:   care explained   choices provided   emotional support provided   empathic listening provided   questions answered   questions encouraged   reassurance provided   thoughts/feelings acknowledged  Goal: Readiness for Transition of Care  Outcome: Ongoing, Progressing   Goal Outcome Evaluation:  Plan of Care Reviewed With: patient        Progress: improving

## (undated) DEVICE — TUBING, SUCTION, 1/4" X 10', STRAIGHT: Brand: MEDLINE

## (undated) DEVICE — SYR LUERLOK 50ML

## (undated) DEVICE — LUBE GEL ENDOGLIDE 1.1OZ

## (undated) DEVICE — SPNG ENDO BEDSIDE TUB ENZYM

## (undated) DEVICE — SOL IRR H2O BTL 1000ML STRL

## (undated) DEVICE — KT ORCA ORCAPOD DISP STRL

## (undated) DEVICE — GRADUATE CONTN 1000ML

## (undated) DEVICE — HYBRID CO2 TUBING/CAP SET FOR OLYMPUS® SCOPES & CO2 SOURCE: Brand: ERBE

## (undated) DEVICE — INTRO ACCSR BLNT TP